# Patient Record
Sex: MALE | Race: WHITE | Employment: FULL TIME | URBAN - METROPOLITAN AREA
[De-identification: names, ages, dates, MRNs, and addresses within clinical notes are randomized per-mention and may not be internally consistent; named-entity substitution may affect disease eponyms.]

---

## 2017-01-05 ENCOUNTER — NURSE ONLY (OUTPATIENT)
Dept: INTERNAL MEDICINE CLINIC | Facility: CLINIC | Age: 42
End: 2017-01-05

## 2017-01-05 DIAGNOSIS — E53.8 VITAMIN B 12 DEFICIENCY: Primary | ICD-10-CM

## 2017-01-05 PROCEDURE — 96372 THER/PROPH/DIAG INJ SC/IM: CPT | Performed by: INTERNAL MEDICINE

## 2017-01-05 RX ORDER — CYANOCOBALAMIN 1000 UG/ML
1000 INJECTION INTRAMUSCULAR; SUBCUTANEOUS ONCE
Status: COMPLETED | OUTPATIENT
Start: 2017-01-05 | End: 2017-01-05

## 2017-01-05 RX ADMIN — CYANOCOBALAMIN 1000 MCG: 1000 INJECTION INTRAMUSCULAR; SUBCUTANEOUS at 17:35:00

## 2017-02-13 NOTE — TELEPHONE ENCOUNTER
Need 90 days  Mail order   Current outpatient prescriptions:   •  Omeprazole 40 MG Oral Capsule Delayed Release, TAKE ONE CAPSULE BY MOUTH EVERY DAY BEFORE A MEAL, Disp: 90 capsule, Rfl: 1  •  Loratadine-Pseudoephedrine ER ([facility] LORAT[facility]INE-D 24 HOUR)

## 2017-02-14 RX ORDER — LORATADINE AND PSEUDOEPHEDRINE 10; 240 MG/1; MG/1
1 TABLET, EXTENDED RELEASE ORAL
Qty: 90 TABLET | Refills: 0 | Status: SHIPPED | OUTPATIENT
Start: 2017-02-14 | End: 2017-03-09

## 2017-02-14 RX ORDER — OMEPRAZOLE 40 MG/1
CAPSULE, DELAYED RELEASE ORAL
Qty: 90 CAPSULE | Refills: 0 | Status: SHIPPED | OUTPATIENT
Start: 2017-02-14 | End: 2017-03-09

## 2017-03-09 RX ORDER — LORATADINE AND PSEUDOEPHEDRINE 10; 240 MG/1; MG/1
1 TABLET, EXTENDED RELEASE ORAL
Qty: 90 TABLET | Refills: 0 | Status: SHIPPED | OUTPATIENT
Start: 2017-03-09 | End: 2017-06-19

## 2017-03-09 RX ORDER — OMEPRAZOLE 40 MG/1
CAPSULE, DELAYED RELEASE ORAL
Qty: 90 CAPSULE | Refills: 0 | Status: SHIPPED | OUTPATIENT
Start: 2017-03-09 | End: 2017-05-27

## 2017-03-09 NOTE — TELEPHONE ENCOUNTER
NEEDS 90 DAYS TO MAIL ORDER [facility]  DO NOT SEND TO THE LOCAL PHARMACY       Current outpatient prescriptions:   •  Loratadine-Pseudoephedrine ER ([facility] LORATADINE-D 24 HOUR)  MG Oral Tablet 24 Hr, Take 1 tablet by mouth once daily. , Disp: 90 tablet, Rfl:

## 2017-04-28 ENCOUNTER — OFFICE VISIT (OUTPATIENT)
Dept: DERMATOLOGY CLINIC | Facility: CLINIC | Age: 42
End: 2017-04-28

## 2017-04-28 DIAGNOSIS — D22.9 MULTIPLE NEVI: Primary | ICD-10-CM

## 2017-04-28 DIAGNOSIS — D23.4 BENIGN NEOPLASM OF SCALP AND SKIN OF NECK: ICD-10-CM

## 2017-04-28 DIAGNOSIS — D23.60 BENIGN NEOPLASM OF SKIN OF UPPER LIMB, INCLUDING SHOULDER, UNSPECIFIED LATERALITY: ICD-10-CM

## 2017-04-28 DIAGNOSIS — L82.1 OTHER SEBORRHEIC KERATOSIS: ICD-10-CM

## 2017-04-28 DIAGNOSIS — D23.5 BENIGN NEOPLASM OF SKIN OF TRUNK, EXCEPT SCROTUM: ICD-10-CM

## 2017-04-28 DIAGNOSIS — D23.70 BENIGN NEOPLASM OF SKIN OF LOWER LIMB, INCLUDING HIP, UNSPECIFIED LATERALITY: ICD-10-CM

## 2017-04-28 DIAGNOSIS — D23.30 BENIGN NEOPLASM OF SKIN OF FACE: ICD-10-CM

## 2017-04-28 PROCEDURE — 99212 OFFICE O/P EST SF 10 MIN: CPT | Performed by: DERMATOLOGY

## 2017-04-28 PROCEDURE — 99213 OFFICE O/P EST LOW 20 MIN: CPT | Performed by: DERMATOLOGY

## 2017-05-15 NOTE — PROGRESS NOTES
Edwin Brunson is a 35year old male. HPI:     CC:  Patient presents with:  Full Skin Exam: LOV 7/24/2015. Pt presenting for full body skin exam. Pt denies personal and family hx of skin cancer.         Allergies:  Review of patient's allergies indicates couple, soda     Pt has a pacemaker No    Pt has a defibrillator No    Reaction to local anesthetic No     Social History Narrative     Family History   Problem Relation Age of Onset   • Cancer Maternal Grandfather    • Lung Disorder Paternal Grandfather neoplasm of skin of face  Benign neoplasm of skin of lower limb, including hip, unspecified laterality  Benign neoplasm of skin of trunk, except scrotum  Benign neoplasm of skin of upper limb, including shoulder, unspecified laterality  Other seborrheic ke

## 2017-05-29 RX ORDER — OMEPRAZOLE 40 MG/1
CAPSULE, DELAYED RELEASE ORAL
Qty: 90 CAPSULE | Refills: 1 | Status: SHIPPED | OUTPATIENT
Start: 2017-05-29 | End: 2017-09-13

## 2017-06-19 ENCOUNTER — TELEPHONE (OUTPATIENT)
Dept: INTERNAL MEDICINE CLINIC | Facility: CLINIC | Age: 42
End: 2017-06-19

## 2017-06-19 NOTE — TELEPHONE ENCOUNTER
Needs 90 days supply send to [facility] in [place] he has been waiting for this refill for 3 weeks I did let patient know no request was send    •  Loratadine-Pseudoephedrine ER ([facility] LORATADINE-D 24 HOUR)  MG Oral Tablet 24 Hr, Take 1 tablet by mouth once

## 2017-09-11 NOTE — TELEPHONE ENCOUNTER
Current Outpatient Prescriptions:     •  Loratadine-Pseudoephedrine ER ([facility] LORAT[facility]INE-D 24 HOUR)  MG Oral Tablet 24 Hr, Take 1 tablet by mouth once daily. , Disp: 90 tablet, Rfl: 0 REFILL

## 2017-09-12 RX ORDER — LORATADINE AND PSEUDOEPHEDRINE 10; 240 MG/1; MG/1
1 TABLET, EXTENDED RELEASE ORAL
Qty: 90 TABLET | Refills: 0 | Status: SHIPPED | OUTPATIENT
Start: 2017-09-12 | End: 2017-09-13

## 2017-09-13 ENCOUNTER — OFFICE VISIT (OUTPATIENT)
Dept: INTERNAL MEDICINE CLINIC | Facility: CLINIC | Age: 42
End: 2017-09-13

## 2017-09-13 VITALS
BODY MASS INDEX: 25.18 KG/M2 | HEIGHT: 69 IN | SYSTOLIC BLOOD PRESSURE: 137 MMHG | WEIGHT: 170 LBS | TEMPERATURE: 99 F | DIASTOLIC BLOOD PRESSURE: 81 MMHG | OXYGEN SATURATION: 94 % | HEART RATE: 97 BPM

## 2017-09-13 DIAGNOSIS — N40.1 BENIGN PROSTATIC HYPERPLASIA (BPH) WITH STRAINING ON URINATION: ICD-10-CM

## 2017-09-13 DIAGNOSIS — J30.9 CHRONIC ALLERGIC RHINITIS, UNSPECIFIED SEASONALITY, UNSPECIFIED TRIGGER: Primary | ICD-10-CM

## 2017-09-13 DIAGNOSIS — N13.8 BPH WITH OBSTRUCTION/LOWER URINARY TRACT SYMPTOMS: ICD-10-CM

## 2017-09-13 DIAGNOSIS — R39.16 BENIGN PROSTATIC HYPERPLASIA (BPH) WITH STRAINING ON URINATION: ICD-10-CM

## 2017-09-13 DIAGNOSIS — N40.1 BPH WITH OBSTRUCTION/LOWER URINARY TRACT SYMPTOMS: ICD-10-CM

## 2017-09-13 PROCEDURE — 99214 OFFICE O/P EST MOD 30 MIN: CPT | Performed by: INTERNAL MEDICINE

## 2017-09-13 PROCEDURE — 99212 OFFICE O/P EST SF 10 MIN: CPT | Performed by: INTERNAL MEDICINE

## 2017-09-13 RX ORDER — OMEPRAZOLE 40 MG/1
CAPSULE, DELAYED RELEASE ORAL
Qty: 90 CAPSULE | Refills: 1 | Status: SHIPPED | OUTPATIENT
Start: 2017-09-13 | End: 2018-01-08

## 2017-09-13 RX ORDER — LORATADINE AND PSEUDOEPHEDRINE 10; 240 MG/1; MG/1
1 TABLET, EXTENDED RELEASE ORAL
Qty: 90 TABLET | Refills: 1 | Status: SHIPPED | OUTPATIENT
Start: 2017-09-13 | End: 2018-05-09

## 2017-09-13 NOTE — PATIENT INSTRUCTIONS
Chronic allergic rhinitis, unspecified seasonality, unspecified trigger  (primary encounter diagnosis) lorataine daily , if symptoms worse  He needs to follow up with allergie specialist     [facility] with obstruction/lower urinary tract symptoms will refer to u

## 2017-09-13 NOTE — PROGRESS NOTES
HPI:    Patient ID: Tatianna Nicholas is a 35year old male.     HPI she is here today for refill on his meds   He states that his allergies this time are very bad, he can not open his eyes from itchiness , if he doesn't take his pills   He also states   Has Disp: 90 tablet Rfl: 1   Omeprazole 40 MG Oral Capsule Delayed Release TAKE 1 CAPSULE DAILY BEFOREA MEAL Disp: 90 capsule Rfl: 1     Allergies:No Known Allergies    HISTORY:  Past Medical History:   Diagnosis Date   • Allergic rhinitis    • Chronic sinusit deviation present. No thyroid mass and no thyromegaly present. Cardiovascular: Normal rate, regular rhythm, normal heart sounds and intact distal pulses. Exam reveals no gallop and no friction rub. No murmur heard.   Pulmonary/Chest: Effort normal and

## 2018-01-09 RX ORDER — OMEPRAZOLE 40 MG/1
CAPSULE, DELAYED RELEASE ORAL
Qty: 90 CAPSULE | Refills: 0 | Status: SHIPPED | OUTPATIENT
Start: 2018-01-09 | End: 2018-04-25

## 2018-01-09 NOTE — TELEPHONE ENCOUNTER
Current Outpatient Prescriptions:    •  Omeprazole 40 MG Oral Capsule Delayed Release, TAKE 1 CAPSULE DAILY BEFOREA MEAL, Disp: 90 capsule, Rfl: 1

## 2018-04-25 ENCOUNTER — TELEPHONE (OUTPATIENT)
Dept: INTERNAL MEDICINE CLINIC | Facility: CLINIC | Age: 43
End: 2018-04-25

## 2018-04-25 RX ORDER — OMEPRAZOLE 40 MG/1
CAPSULE, DELAYED RELEASE ORAL
Qty: 90 CAPSULE | Refills: 0 | Status: SHIPPED | OUTPATIENT
Start: 2018-04-25 | End: 2018-05-09

## 2018-05-09 ENCOUNTER — OFFICE VISIT (OUTPATIENT)
Dept: INTERNAL MEDICINE CLINIC | Facility: CLINIC | Age: 43
End: 2018-05-09

## 2018-05-09 ENCOUNTER — PRIOR ORIGINAL RECORDS (OUTPATIENT)
Dept: OTHER | Age: 43
End: 2018-05-09

## 2018-05-09 VITALS
DIASTOLIC BLOOD PRESSURE: 96 MMHG | HEART RATE: 100 BPM | SYSTOLIC BLOOD PRESSURE: 130 MMHG | TEMPERATURE: 98 F | HEIGHT: 69 IN | OXYGEN SATURATION: 98 % | BODY MASS INDEX: 26.1 KG/M2 | WEIGHT: 176.19 LBS

## 2018-05-09 DIAGNOSIS — Z00.00 PHYSICAL EXAM, ANNUAL: Primary | ICD-10-CM

## 2018-05-09 PROCEDURE — 99396 PREV VISIT EST AGE 40-64: CPT | Performed by: INTERNAL MEDICINE

## 2018-05-09 PROCEDURE — 36415 COLL VENOUS BLD VENIPUNCTURE: CPT | Performed by: INTERNAL MEDICINE

## 2018-05-09 RX ORDER — MELATONIN
1000 DAILY
COMMUNITY
End: 2020-03-14 | Stop reason: ALTCHOICE

## 2018-05-09 RX ORDER — OMEPRAZOLE 20 MG/1
20 CAPSULE, DELAYED RELEASE ORAL
Qty: 90 CAPSULE | Refills: 0 | Status: SHIPPED | OUTPATIENT
Start: 2018-05-09 | End: 2018-07-22

## 2018-05-09 RX ORDER — OMEPRAZOLE 40 MG/1
CAPSULE, DELAYED RELEASE ORAL
Qty: 90 CAPSULE | Refills: 0 | Status: SHIPPED | OUTPATIENT
Start: 2018-05-09 | End: 2018-05-09 | Stop reason: CLARIF

## 2018-05-09 RX ORDER — LORATADINE AND PSEUDOEPHEDRINE 10; 240 MG/1; MG/1
1 TABLET, EXTENDED RELEASE ORAL
Qty: 90 TABLET | Refills: 1 | Status: SHIPPED | OUTPATIENT
Start: 2018-05-09 | End: 2018-06-21

## 2018-05-10 NOTE — PATIENT INSTRUCTIONS
Prevention Guidelines, Men Ages 36 to 52  Screening tests and vaccines are an important part of managing your health. Health counseling is essential, too. Below are guidelines for these, for men ages 36 to 52.  Talk with your healthcare provider to make s Chickenpox (varicella) All men in this age group who have no record of this infection or vaccine 2 doses; the second dose should be given at least 4 weeks after the first dose   Hepatitis A Men at increased risk for infection – talk with your healthcare pr Use of tobacco and the health effects it can cause All men in this age group Every exam   [facility]  Date Last Reviewed: 2/1/2017  © [id number] [employer] [address].  [facility]

## 2018-05-17 ENCOUNTER — APPOINTMENT (OUTPATIENT)
Dept: GENERAL RADIOLOGY | Facility: HOSPITAL | Age: 43
End: 2018-05-17
Payer: COMMERCIAL

## 2018-05-17 ENCOUNTER — PRIOR ORIGINAL RECORDS (OUTPATIENT)
Dept: OTHER | Age: 43
End: 2018-05-17

## 2018-05-17 ENCOUNTER — HOSPITAL ENCOUNTER (EMERGENCY)
Facility: HOSPITAL | Age: 43
Discharge: HOME | End: 2018-05-17
Payer: COMMERCIAL

## 2018-05-17 VITALS
OXYGEN SATURATION: 96 % | WEIGHT: 170 LBS | HEART RATE: 94 BPM | RESPIRATION RATE: 20 BRPM | BODY MASS INDEX: 25.18 KG/M2 | SYSTOLIC BLOOD PRESSURE: 122 MMHG | DIASTOLIC BLOOD PRESSURE: 90 MMHG | HEIGHT: 69 IN | TEMPERATURE: 98 F

## 2018-05-17 DIAGNOSIS — R07.89 CHEST PAIN, ATYPICAL: Primary | ICD-10-CM

## 2018-05-17 PROCEDURE — 36415 COLL VENOUS BLD VENIPUNCTURE: CPT

## 2018-05-17 PROCEDURE — 93005 ELECTROCARDIOGRAM TRACING: CPT

## 2018-05-17 PROCEDURE — 85379 FIBRIN DEGRADATION QUANT: CPT | Performed by: EMERGENCY MEDICINE

## 2018-05-17 PROCEDURE — 85025 COMPLETE CBC W/AUTO DIFF WBC: CPT

## 2018-05-17 PROCEDURE — 93010 ELECTROCARDIOGRAM REPORT: CPT | Performed by: INTERNAL MEDICINE

## 2018-05-17 PROCEDURE — 71045 X-RAY EXAM CHEST 1 VIEW: CPT

## 2018-05-17 PROCEDURE — 99285 EMERGENCY DEPT VISIT HI MDM: CPT

## 2018-05-17 PROCEDURE — 84484 ASSAY OF TROPONIN QUANT: CPT

## 2018-05-17 PROCEDURE — 80048 BASIC METABOLIC PNL TOTAL CA: CPT

## 2018-05-18 NOTE — ED PROVIDER NOTES
Patient Seen in: [facility] AND [facility] Emergency Department    History   Patient presents with:  Chest Pain Angina (cardiovascular)    Stated Complaint: chest pain     HPI    Patient presents with complaint of about 5 hours ago with onset of a discomfort in tobacco: Never Used                      Comment: 3x cigars per year  Alcohol use:  Yes              Comment: Occasionally       Review of Systems  Constitutional: no fever, has otherwise been feeling well, normal appetite, normal energy  HEENT: No cough, n is not feeling the symptoms at this time.   I discussed what workup showed limitations of workup in the ER possible etiologies we discussed limitations of testing and we discussed possible admission versus discharge for follow-up testing we discussed possib

## 2018-05-23 ENCOUNTER — OFFICE VISIT (OUTPATIENT)
Dept: INTERNAL MEDICINE CLINIC | Facility: CLINIC | Age: 43
End: 2018-05-23

## 2018-05-23 VITALS
WEIGHT: 174.81 LBS | HEART RATE: 84 BPM | DIASTOLIC BLOOD PRESSURE: 80 MMHG | HEIGHT: 69 IN | OXYGEN SATURATION: 96 % | SYSTOLIC BLOOD PRESSURE: 130 MMHG | TEMPERATURE: 98 F | BODY MASS INDEX: 25.89 KG/M2

## 2018-05-23 DIAGNOSIS — R07.9 CHEST PAIN IN ADULT: ICD-10-CM

## 2018-05-23 DIAGNOSIS — Z09 FOLLOW UP: Primary | ICD-10-CM

## 2018-05-23 PROCEDURE — 99212 OFFICE O/P EST SF 10 MIN: CPT | Performed by: INTERNAL MEDICINE

## 2018-05-23 PROCEDURE — 99213 OFFICE O/P EST LOW 20 MIN: CPT | Performed by: INTERNAL MEDICINE

## 2018-05-24 NOTE — PROGRESS NOTES
HPI:    Patient ID: Vamshi Yates is a 38year old male. HPI he came in today for follow-up from emergency room. 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 He describes the pain substernal,  nonradiating not Take 1,000 mcg by mouth daily. Disp:  Rfl:    Loratadine-Pseudoephedrine ER ([facility] LORATADINE-D 24 HOUR)  MG Oral Tablet 24 Hr Take 1 tablet by mouth once daily.  Disp: 90 tablet Rfl: 1   omeprazole 20 MG Oral Capsule Delayed Release Take 1 capsule (20 EOM are normal. Pupils are equal, round, and reactive to light. Right eye exhibits no discharge. Left eye exhibits no discharge. No scleral icterus. Neck: Normal range of motion. Neck supple. No JVD present. No tracheal tenderness present.  No tracheal de SCORING        #[id number]

## 2018-05-24 NOTE — PATIENT INSTRUCTIONS
Chest pain in adult etiology unclear ?  Low risk factors for cad ( hypercholesterolemia ) could be esophageal spasm as well, he has acid reflux/hiatal hernia - continue with current medication, calcium score, he has kai scheduled with cardiology, explained

## 2018-06-18 LAB
ALBUMIN: 4.3 G/DL
ALKALINE PHOSPHATATE(ALK PHOS): 39 IU/L
ALT (SGPT): 15 U/L
AST (SGOT): 15 U/L
BILIRUBIN TOTAL: 0.7 MG/DL
BUN: 11 MG/DL
BUN: 13 MG/DL
CALCIUM: 9.4 MG/DL
CALCIUM: 9.5 MG/DL
CHLORIDE: 101 MEQ/L
CHLORIDE: 101 MEQ/L
CHOLESTEROL, TOTAL: 214 MG/DL
CREATININE, SERUM: 0.85 MG/DL
CREATININE, SERUM: 0.89 MG/DL
GLOBULIN: 2.9 G/DL
GLUCOSE: 90 MG/DL
GLUCOSE: 90 MG/DL
GLUCOSE: 93 MG/DL
HDL CHOLESTEROL: 48 MG/DL
HEMATOCRIT: 46.3 %
HEMATOCRIT: 47.1 %
HEMOGLOBIN: 16 G/DL
HEMOGLOBIN: 16 G/DL
LDL CHOLESTEROL: 140 MG/DL
NON-HDL CHOLESTEROL: 166 MG/DL
PLATELETS: 255 K/UL
PLATELETS: 293 K/UL
POTASSIUM, SERUM: 4.1 MEQ/L
POTASSIUM, SERUM: 4.2 MEQ/L
PROTEIN, TOTAL: 7.2 G/DL
RED BLOOD COUNT: 5.64 X 10-6/U
RED BLOOD COUNT: 5.7 X 10-6/U
SGOT (AST): 15 IU/L
SGPT (ALT): 15 IU/L
SODIUM: 138 MEQ/L
SODIUM: 139 MEQ/L
THYROID STIMULATING HORMONE: 0.93 MLU/L
TRIGLYCERIDES: 132 MG/DL
WHITE BLOOD COUNT: 8.3 X 10-3/U
WHITE BLOOD COUNT: 9.3 X 10-3/U

## 2018-06-19 ENCOUNTER — TELEPHONE (OUTPATIENT)
Dept: INTERNAL MEDICINE CLINIC | Facility: CLINIC | Age: 43
End: 2018-06-19

## 2018-06-19 ENCOUNTER — PRIOR ORIGINAL RECORDS (OUTPATIENT)
Dept: OTHER | Age: 43
End: 2018-06-19

## 2018-06-19 NOTE — TELEPHONE ENCOUNTER
Needs 90  Days he only received 30 pills last time  Please resent     Current Outpatient Prescriptions:   ••  Loratadine-Pseudoephedrine ER ([facility] LORAT[facility]INE-D 24 HOUR)  MG Oral Tablet 24 Hr, Take 1 tablet by mouth once daily. , Disp: 90 tablet, Rfl: 1

## 2018-06-21 NOTE — TELEPHONE ENCOUNTER
Current Outpatient Prescriptions:   •  •  Loratadine-Pseudoephedrine ER ([facility] LORATADINE-D 24 HOUR)  MG Oral Tablet 24 Hr, Take 1 tablet by mouth once daily. , Disp: 90 tablet, Rfl: 1  •

## 2018-06-22 RX ORDER — LORATADINE AND PSEUDOEPHEDRINE 10; 240 MG/1; MG/1
1 TABLET, EXTENDED RELEASE ORAL
Qty: 90 TABLET | Refills: 1 | OUTPATIENT
Start: 2018-06-22 | End: 2018-09-14

## 2018-06-22 NOTE — TELEPHONE ENCOUNTER
Please advise on refill request.    Refill Protocol Appointment Criteria  · Appointment scheduled in the past 6 months or in the next 3 months  Recent Outpatient Visits            1 month ago Follow up    [facility], [facility] [place],

## 2018-07-09 ENCOUNTER — HOSPITAL ENCOUNTER (OUTPATIENT)
Dept: CT IMAGING | Age: 43
Discharge: HOME | End: 2018-07-09
Attending: INTERNAL MEDICINE

## 2018-07-09 DIAGNOSIS — R07.9 CHEST PAIN IN ADULT: ICD-10-CM

## 2018-07-09 NOTE — PROGRESS NOTES
Pt seen at [facility], [facility]:  PRELIMINARY SCORE= 0.0  BP= 130/80  Cholestec labs as follows: Fasting  TC= 242  HDl= 53  LDL= 160  TG= 142  GLUCOSE= 85  All results and risk factors discussed with patient; all questions and concerns addressed.   Lillian López

## 2018-07-11 ENCOUNTER — TELEPHONE (OUTPATIENT)
Dept: INTERNAL MEDICINE CLINIC | Facility: CLINIC | Age: 43
End: 2018-07-11

## 2018-07-11 NOTE — TELEPHONE ENCOUNTER
Call placed to patient to follow up if he has received voicemail regarding repeat ct chest. Per patient he has received voicemail. He is aware he is to repeat chest ct in 3-6 months.  As of now he wants to do repeat chest ct first then decide if he needs to

## 2018-07-13 ENCOUNTER — MYAURORA ACCOUNT LINK (OUTPATIENT)
Dept: OTHER | Age: 43
End: 2018-07-13

## 2018-07-16 ENCOUNTER — PRIOR ORIGINAL RECORDS (OUTPATIENT)
Dept: OTHER | Age: 43
End: 2018-07-16

## 2018-07-22 RX ORDER — OMEPRAZOLE 20 MG/1
20 CAPSULE, DELAYED RELEASE ORAL
Qty: 90 CAPSULE | Refills: 0 | Status: SHIPPED | OUTPATIENT
Start: 2018-07-22 | End: 2019-05-09

## 2018-07-22 RX ORDER — OMEPRAZOLE 40 MG/1
CAPSULE, DELAYED RELEASE ORAL
Qty: 90 CAPSULE | Refills: 0 | OUTPATIENT
Start: 2018-07-22

## 2018-07-22 NOTE — TELEPHONE ENCOUNTER
Omeprazole 20 mg active in profile not omeprazole 40 mg being requested. Correct dosage sent per protocol.      Refill Protocol Appointment Criteria  · Appointment scheduled in the past 12 months or in the next 3 months Gastrointestional Medication:  ·   Re

## 2018-08-15 ENCOUNTER — OFFICE VISIT (OUTPATIENT)
Dept: SURGERY | Facility: CLINIC | Age: 43
End: 2018-08-15
Payer: COMMERCIAL

## 2018-08-15 VITALS
HEART RATE: 99 BPM | WEIGHT: 175 LBS | DIASTOLIC BLOOD PRESSURE: 84 MMHG | BODY MASS INDEX: 25.92 KG/M2 | HEIGHT: 69 IN | SYSTOLIC BLOOD PRESSURE: 126 MMHG | TEMPERATURE: 98 F

## 2018-08-15 DIAGNOSIS — R35.0 FREQUENCY OF URINATION: Primary | ICD-10-CM

## 2018-08-15 NOTE — PROGRESS NOTES
[facility]3], [facility]  Initial Office Consultation    HPI:   Terry Roach is a 38year old male here today for follow-up of his mild lower urinary tract symptoms. He was a patient of Dr. Madison Mcdonald prior to his care home.   He was last seen in 6/2016 for 1,000 mcg by mouth daily. Disp:  Rfl:        Allergies: Patient has no known allergies. REVIEW OF SYSTEMS:  Review of Systems   Constitutional: Negative for appetite change, chills, fatigue, fever and unexpected weight change.    HENT: Negative for heari 05/17/2018    05/17/2018   MPV 8.3 05/17/2018       Lab Results  Component Value Date   GLU 93 05/17/2018   BUN 11 05/17/2018   BUNCREA 12.9 05/17/2018   CREATSERUM 0.85 05/17/2018   ANIONGAP 8 05/17/2018   GFRNAA >60 05/17/2018   GFRAA >60 05/17/20

## 2018-09-04 ENCOUNTER — PRIOR ORIGINAL RECORDS (OUTPATIENT)
Dept: OTHER | Age: 43
End: 2018-09-04

## 2018-09-14 RX ORDER — LORATADINE AND PSEUDOEPHEDRINE 10; 240 MG/1; MG/1
1 TABLET, EXTENDED RELEASE ORAL
Qty: 90 TABLET | Refills: 1 | Status: SHIPPED | OUTPATIENT
Start: 2018-09-14 | End: 2019-03-30

## 2018-10-20 ENCOUNTER — PATIENT MESSAGE (OUTPATIENT)
Dept: INTERNAL MEDICINE CLINIC | Facility: CLINIC | Age: 43
End: 2018-10-20

## 2018-10-20 ENCOUNTER — OFFICE VISIT (OUTPATIENT)
Dept: INTERNAL MEDICINE CLINIC | Facility: CLINIC | Age: 43
End: 2018-10-20
Payer: COMMERCIAL

## 2018-10-20 VITALS
SYSTOLIC BLOOD PRESSURE: 127 MMHG | BODY MASS INDEX: 25.62 KG/M2 | HEART RATE: 105 BPM | WEIGHT: 173 LBS | DIASTOLIC BLOOD PRESSURE: 84 MMHG | TEMPERATURE: 98 F | HEIGHT: 69 IN

## 2018-10-20 DIAGNOSIS — E78.2 HYPERLIPEMIA, MIXED: ICD-10-CM

## 2018-10-20 DIAGNOSIS — R05.9 COUGH: Primary | ICD-10-CM

## 2018-10-20 DIAGNOSIS — R09.81 CONGESTED NOSE: ICD-10-CM

## 2018-10-20 DIAGNOSIS — R91.1 INCIDENTAL LUNG NODULE: ICD-10-CM

## 2018-10-20 DIAGNOSIS — E53.8 LOW SERUM VITAMIN B12: ICD-10-CM

## 2018-10-20 DIAGNOSIS — J30.2 SEASONAL ALLERGIES: ICD-10-CM

## 2018-10-20 PROCEDURE — 36415 COLL VENOUS BLD VENIPUNCTURE: CPT | Performed by: INTERNAL MEDICINE

## 2018-10-20 PROCEDURE — 99214 OFFICE O/P EST MOD 30 MIN: CPT | Performed by: INTERNAL MEDICINE

## 2018-10-20 RX ORDER — BENZONATATE 100 MG/1
100 CAPSULE ORAL 3 TIMES DAILY PRN
Qty: 20 CAPSULE | Refills: 0 | Status: SHIPPED | OUTPATIENT
Start: 2018-10-20 | End: 2018-10-27

## 2018-10-20 NOTE — PROGRESS NOTES
HPI:    Patient ID: Cory Campos is a 38year old male. HPI  Patient comes in with complaint of congestion runny nose cough is been going on for a few weeks now not getting better feeling fatigue due to not sleeping well.   Patient does have allergies Surgical History:   Procedure Laterality Date   • OTHER SURGICAL HISTORY  2004    Chronic sinusitis, sinus surgery    • VASECTOMY  2011      Family History   Problem Relation Age of Onset   • Cancer Maternal Grandfather    • Lung Disorder Paternal Griselda Cheri above  Low serum vitamin b12 check B12 let you know results  Hyperlipemia, mixed in the past with elevated cholesterol his fasting we will check today will call you with results  Incidental lung nodule CAT scan was ordered by Dr. Mauri Mccray will give pr

## 2018-10-22 RX ORDER — FLUTICASONE PROPIONATE 50 MCG
2 SPRAY, SUSPENSION (ML) NASAL DAILY
Qty: 3 BOTTLE | Refills: 3 | Status: SHIPPED | OUTPATIENT
Start: 2018-10-22 | End: 2019-10-17

## 2018-10-22 NOTE — TELEPHONE ENCOUNTER
From: Vamshi Yates  To: Sanket Sy MD  Sent: 10/20/2018 6:50 PM CDT  Subject: Prescription Question    Hi Viviane Chery to meet you today. You Suggested I take Flonase but did not provide a prescription and [facility] one.  Can you create a prescrip

## 2018-10-29 RX ORDER — OMEPRAZOLE 40 MG/1
CAPSULE, DELAYED RELEASE ORAL
Qty: 90 CAPSULE | Refills: 0 | Status: SHIPPED | OUTPATIENT
Start: 2018-10-29 | End: 2019-01-10

## 2018-11-03 ENCOUNTER — HOSPITAL ENCOUNTER (OUTPATIENT)
Dept: CT IMAGING | Facility: HOSPITAL | Age: 43
Discharge: HOME | End: 2018-11-03
Attending: INTERNAL MEDICINE
Payer: COMMERCIAL

## 2018-11-03 DIAGNOSIS — R91.1 LUNG NODULE: ICD-10-CM

## 2018-11-03 PROCEDURE — 71250 CT THORAX DX C-: CPT | Performed by: INTERNAL MEDICINE

## 2018-11-06 ENCOUNTER — TELEPHONE (OUTPATIENT)
Dept: INTERNAL MEDICINE CLINIC | Facility: CLINIC | Age: 43
End: 2018-11-06

## 2018-11-06 ENCOUNTER — TELEPHONE (OUTPATIENT)
Dept: PULMONOLOGY | Facility: CLINIC | Age: 43
End: 2018-11-06

## 2018-11-06 NOTE — TELEPHONE ENCOUNTER
Spoke with patient today regarding CT results and provided pulmonology referral information. He will schedule follow up with pulmonology today.

## 2018-11-06 NOTE — TELEPHONE ENCOUNTER
I just spoke with him also,   He has seen Dr. Job Cuevas in past told him who ever he can get in soonest with Dr. Job Cuevas or Dr. Adi Maza to call if unable to get in with 30 days.

## 2018-11-06 NOTE — TELEPHONE ENCOUNTER
Pt states that he had CT done per Dr. Rafia Strong and it showed lung nodules. Pt last saw Dr. Ekaterina Womack in 2014 and scheduled appt for 12/12/18 (next available).   Pt is requesting that Dr Ekaterina Womack look at  [facility] and advise if he should have any testing done before this

## 2018-11-06 NOTE — TELEPHONE ENCOUNTER
Has appt with Dr. Blanco Bain 12/12/18  Ct chest report faxed to Dr. Blanco Bain to review. Asking if he should have PET scan before seeing Dr. Blanco Bain asking how urgent this is with increase in size of nodule.

## 2018-11-07 ENCOUNTER — OFFICE VISIT (OUTPATIENT)
Dept: PULMONOLOGY | Facility: CLINIC | Age: 43
End: 2018-11-07
Payer: COMMERCIAL

## 2018-11-07 ENCOUNTER — TELEPHONE (OUTPATIENT)
Dept: INTERNAL MEDICINE CLINIC | Facility: CLINIC | Age: 43
End: 2018-11-07

## 2018-11-07 VITALS
HEART RATE: 118 BPM | RESPIRATION RATE: 16 BRPM | SYSTOLIC BLOOD PRESSURE: 113 MMHG | HEIGHT: 69 IN | OXYGEN SATURATION: 95 % | DIASTOLIC BLOOD PRESSURE: 77 MMHG | BODY MASS INDEX: 26.39 KG/M2 | WEIGHT: 178.19 LBS

## 2018-11-07 DIAGNOSIS — R91.1 LUNG NODULE: Primary | ICD-10-CM

## 2018-11-07 PROCEDURE — 99212 OFFICE O/P EST SF 10 MIN: CPT | Performed by: INTERNAL MEDICINE

## 2018-11-07 PROCEDURE — 99214 OFFICE O/P EST MOD 30 MIN: CPT | Performed by: INTERNAL MEDICINE

## 2018-11-07 NOTE — TELEPHONE ENCOUNTER
LMTCB--ok to transfer to RN, if RN not available please offer appt today 11/7 @ 4 pm with Dr. Leslie iLn at [facility] (Refer to TE 11/6/18 Dr. Leslie Lin)

## 2018-11-07 NOTE — TELEPHONE ENCOUNTER
Requesting to speak to you direct  patient saw  Dr. Angi Zamorano as recommended . He states the results were not correct  The radiology misread the results .  Need to be corrected in his chart

## 2018-11-07 NOTE — PROGRESS NOTES
HPI:    Patient ID: Terry Roach is a 37year old male.     HPI  Patient completely asymptomatic from pulmonary point of view  He had recent cold symptoms a few weeks ago which is now completely resolved  Otherwise no chest pain or shortness of breath  N likely benign granuloma   37year old male with no h/o smoking   Asymptomatic with normal lung exam     Seen in 2014 for few small lung nodules / largest LLL 9-11 mm and was discharged since stable .     Now had another chest ct and showed subtle increase i

## 2018-11-16 ENCOUNTER — PRIOR ORIGINAL RECORDS (OUTPATIENT)
Dept: OTHER | Age: 43
End: 2018-11-16

## 2019-01-10 ENCOUNTER — TELEPHONE (OUTPATIENT)
Dept: INTERNAL MEDICINE CLINIC | Facility: CLINIC | Age: 44
End: 2019-01-10

## 2019-01-10 ENCOUNTER — PRIOR ORIGINAL RECORDS (OUTPATIENT)
Dept: OTHER | Age: 44
End: 2019-01-10

## 2019-01-10 RX ORDER — OMEPRAZOLE 40 MG/1
CAPSULE, DELAYED RELEASE ORAL
Qty: 90 CAPSULE | Refills: 0 | Status: SHIPPED | OUTPATIENT
Start: 2019-01-10 | End: 2019-05-09

## 2019-02-28 VITALS
SYSTOLIC BLOOD PRESSURE: 120 MMHG | OXYGEN SATURATION: 98 % | HEIGHT: 69 IN | HEART RATE: 64 BPM | WEIGHT: 173 LBS | DIASTOLIC BLOOD PRESSURE: 80 MMHG | BODY MASS INDEX: 25.62 KG/M2

## 2019-04-01 RX ORDER — LORATADINE 10 MG
TABLET ORAL
Qty: 165 TABLET | Refills: 0 | Status: SHIPPED | OUTPATIENT
Start: 2019-04-01 | End: 2019-04-04

## 2019-04-04 RX ORDER — LORATADINE 10 MG
TABLET ORAL
Qty: 165 TABLET | Refills: 0 | Status: SHIPPED | OUTPATIENT
Start: 2019-04-04 | End: 2019-04-11

## 2019-04-11 RX ORDER — LORATADINE AND PSEUDOEPHEDRINE 10; 240 MG/1; MG/1
1 TABLET, EXTENDED RELEASE ORAL
Qty: 165 TABLET | Refills: 0 | Status: SHIPPED | OUTPATIENT
Start: 2019-04-11 | End: 2019-04-13 | Stop reason: CLARIF

## 2019-04-11 NOTE — TELEPHONE ENCOUNTER
Refill passed per [employer] protocol.  Pharmacy did not receive previous request.  Refill Protocol Appointment Criteria  · Appointment scheduled in the past 12 months or in the next 3 months  Recent Outpatient Visits            5 months ago Lung nodul

## 2019-04-13 RX ORDER — LORATADINE 10 MG
TABLET ORAL
Qty: 165 TABLET | Refills: 0 | Status: SHIPPED | OUTPATIENT
Start: 2019-04-13 | End: 2019-12-01

## 2019-04-27 ENCOUNTER — OFFICE VISIT (OUTPATIENT)
Dept: DERMATOLOGY CLINIC | Facility: CLINIC | Age: 44
End: 2019-04-27
Payer: COMMERCIAL

## 2019-04-27 DIAGNOSIS — D23.70 BENIGN NEOPLASM OF SKIN OF LOWER LIMB, INCLUDING HIP, UNSPECIFIED LATERALITY: ICD-10-CM

## 2019-04-27 DIAGNOSIS — D23.30 BENIGN NEOPLASM OF SKIN OF FACE: ICD-10-CM

## 2019-04-27 DIAGNOSIS — D23.5 BENIGN NEOPLASM OF SKIN OF TRUNK, EXCEPT SCROTUM: ICD-10-CM

## 2019-04-27 DIAGNOSIS — D22.9 MULTIPLE NEVI: Primary | ICD-10-CM

## 2019-04-27 DIAGNOSIS — D23.4 BENIGN NEOPLASM OF SCALP AND SKIN OF NECK: ICD-10-CM

## 2019-04-27 DIAGNOSIS — L82.1 OTHER SEBORRHEIC KERATOSIS: ICD-10-CM

## 2019-04-27 DIAGNOSIS — D23.60 BENIGN NEOPLASM OF SKIN OF UPPER LIMB, INCLUDING SHOULDER, UNSPECIFIED LATERALITY: ICD-10-CM

## 2019-04-27 PROCEDURE — 99213 OFFICE O/P EST LOW 20 MIN: CPT | Performed by: DERMATOLOGY

## 2019-04-27 PROCEDURE — 99212 OFFICE O/P EST SF 10 MIN: CPT | Performed by: DERMATOLOGY

## 2019-05-01 NOTE — TELEPHONE ENCOUNTER
Refill passed per [facility] protocol  Refill Protocol Appointment Criteria  · Appointment scheduled in the past 12 months or in the next 3 months  Recent Outpatient Visits            4 days ago     [facility] Ron Zheng

## 2019-05-02 RX ORDER — OMEPRAZOLE 40 MG/1
CAPSULE, DELAYED RELEASE ORAL
Qty: 90 CAPSULE | Refills: 1 | OUTPATIENT
Start: 2019-05-02

## 2019-05-06 NOTE — PROGRESS NOTES
Doris Ya is a 37year old male. HPI:     CC:  Patient presents with:  Moles: LOV 4-28-17. Pt here for annual full body check. No c/o today. No pers or fam hx of skin ca. Allergies:  Patient has no known allergies.     HISTORY:    Past Med education: Not on file      Highest education level: Not on file    Occupational History      Not on file    Social Needs      Financial resource strain: Not on file      Food insecurity:        Worry: Not on file        Inability: Not on file      Transpo defibrillator: No        Reaction to local anesthetic: No    Social History Narrative      Not on file    Family History   Problem Relation Age of Onset   • Cancer Maternal Grandfather    • Lung Disorder Paternal Grandfather         Emphysema       There w of face  Benign neoplasm of skin of lower limb, including hip, unspecified laterality  Benign neoplasm of skin of trunk, except scrotum  Benign neoplasm of skin of upper limb, including shoulder, unspecified laterality  Other seborrheic keratosis    See de

## 2019-05-09 ENCOUNTER — TELEPHONE (OUTPATIENT)
Dept: INTERNAL MEDICINE CLINIC | Facility: CLINIC | Age: 44
End: 2019-05-09

## 2019-05-09 RX ORDER — OMEPRAZOLE 40 MG/1
CAPSULE, DELAYED RELEASE ORAL
Qty: 90 CAPSULE | Refills: 1 | Status: SHIPPED | OUTPATIENT
Start: 2019-05-09 | End: 2019-11-16

## 2019-07-03 ENCOUNTER — TELEPHONE (OUTPATIENT)
Dept: PULMONOLOGY | Facility: CLINIC | Age: 44
End: 2019-07-03

## 2019-08-20 NOTE — TELEPHONE ENCOUNTER
Rx passed protocol but considered controlled, so needs MD approval.  Refill Protocol Appointment Criteria  · Appointment scheduled in the past 12 months or in the next 3 months  Recent Outpatient Visits            4 months ago Lung nodule    Ann Terry supervision

## 2019-09-04 ENCOUNTER — OFFICE VISIT (OUTPATIENT)
Dept: INTERNAL MEDICINE CLINIC | Facility: CLINIC | Age: 44
End: 2019-09-04
Payer: COMMERCIAL

## 2019-09-04 VITALS
DIASTOLIC BLOOD PRESSURE: 78 MMHG | TEMPERATURE: 98 F | RESPIRATION RATE: 18 BRPM | WEIGHT: 178 LBS | SYSTOLIC BLOOD PRESSURE: 121 MMHG | HEART RATE: 78 BPM | BODY MASS INDEX: 26.36 KG/M2 | HEIGHT: 69 IN

## 2019-09-04 DIAGNOSIS — M54.40 ACUTE RIGHT-SIDED LOW BACK PAIN WITH SCIATICA, SCIATICA LATERALITY UNSPECIFIED: Primary | ICD-10-CM

## 2019-09-04 PROCEDURE — 99214 OFFICE O/P EST MOD 30 MIN: CPT | Performed by: INTERNAL MEDICINE

## 2019-09-04 RX ORDER — METHYLPREDNISOLONE 4 MG/1
TABLET ORAL
Qty: 1 KIT | Refills: 0 | Status: SHIPPED | OUTPATIENT
Start: 2019-09-04 | End: 2020-02-17

## 2019-09-04 RX ORDER — CYCLOBENZAPRINE HCL 5 MG
5 TABLET ORAL 3 TIMES DAILY PRN
Qty: 30 TABLET | Refills: 0 | Status: SHIPPED | OUTPATIENT
Start: 2019-09-04 | End: 2020-02-17

## 2019-09-04 NOTE — PROGRESS NOTES
HPI:    Patient ID: Domo Phelps is a 37year old male. HPI he came in today complaining of right lower back pain radiating down to his right leg that started on Saturday .  He states is worse when he is sitting and walking and is better when he is st MEAL Disp: 90 capsule Rfl: 1   WAL-CHESTERIN D 24 HOUR  MG Oral Tablet 24 Hr TAKE ONE TABLET BY MOUTH DAILY Disp: 165 tablet Rfl: 0   Fluticasone Propionate 50 MCG/ACT Nasal Suspension 2 sprays by Each Nare route daily.  Disp: 3 Bottle Rfl: 3   Vitamin B-1 light. Conjunctivae and EOM are normal. Right eye exhibits no discharge. Left eye exhibits no discharge. No scleral icterus. Neck: Normal range of motion. Neck supple. No JVD present. No tracheal tenderness present. No tracheal deviation present.  No thyr Visit:  Requested Prescriptions      No prescriptions requested or ordered in this encounter       Imaging & Referrals:  None        PL#[id number]

## 2019-09-04 NOTE — PATIENT INSTRUCTIONS
Lower back painCareful with back. Correct lifting with legs and not with back. Turn body completely to lift something from side. Back exercises.  Correct posture when sitting with feet flat on floor and back against chair and computer at eye level., medrol

## 2019-10-01 ENCOUNTER — TELEPHONE (OUTPATIENT)
Dept: INTERNAL MEDICINE CLINIC | Facility: CLINIC | Age: 44
End: 2019-10-01

## 2019-10-01 NOTE — TELEPHONE ENCOUNTER
Patient calling to find out what number he needs to call to schedule CT chest appointment. Patient given number for central scheduling. Patient transferred to central scheduling to make appointment.

## 2019-10-08 ENCOUNTER — HOSPITAL ENCOUNTER (OUTPATIENT)
Dept: CT IMAGING | Facility: HOSPITAL | Age: 44
Discharge: HOME | End: 2019-10-08
Attending: INTERNAL MEDICINE
Payer: COMMERCIAL

## 2019-10-08 DIAGNOSIS — R91.1 LUNG NODULE: ICD-10-CM

## 2019-10-08 PROCEDURE — 71250 CT THORAX DX C-: CPT | Performed by: INTERNAL MEDICINE

## 2019-11-16 RX ORDER — OMEPRAZOLE 40 MG/1
CAPSULE, DELAYED RELEASE ORAL
Qty: 90 CAPSULE | Refills: 1 | Status: SHIPPED | OUTPATIENT
Start: 2019-11-16 | End: 2020-06-04

## 2019-11-17 NOTE — TELEPHONE ENCOUNTER
Refill passed per [facility] protocol.     Refill Protocol Appointment Criteria  · Appointment scheduled in the past 12 months or in the next 3 months  Recent Outpatient Visits            2 months ago Acute right-sided low back pain with sciatica, scia

## 2019-12-03 DIAGNOSIS — R91.8 LUNG NODULES: Primary | ICD-10-CM

## 2019-12-03 RX ORDER — LORATADINE 10 MG
TABLET ORAL
Qty: 90 TABLET | Refills: 1 | Status: SHIPPED
Start: 2019-12-03 | End: 2019-12-03

## 2019-12-03 NOTE — TELEPHONE ENCOUNTER
Refill passed per [facility] protocol. RN unable to approve. pls send if in agreement. Thank you.     Requested Prescriptions   Pending Prescriptions Disp Refills   • WAL-LASHAUN D 24 HOUR  MG Oral Tablet 24 Hr [Pharmacy Med Name: Percy Castorena 25 HOUR

## 2019-12-04 RX ORDER — LORATADINE 10 MG
TABLET ORAL
Qty: 90 TABLET | Refills: 1 | Status: SHIPPED
Start: 2019-12-04 | End: 2020-06-04

## 2019-12-04 NOTE — TELEPHONE ENCOUNTER
WAL-ITIN D 24 HOUR  MG Oral Tablet 24 Hr  Called into the [facility] in [place].      Call center please call and schedule an appointment. Thank You. An 99.cot message was also sent.

## 2019-12-04 NOTE — TELEPHONE ENCOUNTER
Script transmission to pharmacy failed earlier today. pls resend. Thank you.     Requested Prescriptions   Pending Prescriptions Disp Refills   • WAL-ITIN D 24 HOUR  MG Oral Tablet 24 Hr [Pharmacy Med Name: Aguila Pagan 25 HOUR TABLETS 10'S] 165 tablet

## 2020-02-17 ENCOUNTER — OFFICE VISIT (OUTPATIENT)
Dept: INTERNAL MEDICINE CLINIC | Facility: CLINIC | Age: 45
End: 2020-02-17
Payer: COMMERCIAL

## 2020-02-17 VITALS
WEIGHT: 180 LBS | HEART RATE: 78 BPM | HEIGHT: 69 IN | RESPIRATION RATE: 18 BRPM | DIASTOLIC BLOOD PRESSURE: 82 MMHG | SYSTOLIC BLOOD PRESSURE: 129 MMHG | TEMPERATURE: 98 F | BODY MASS INDEX: 26.66 KG/M2

## 2020-02-17 DIAGNOSIS — Z00.00 ANNUAL PHYSICAL EXAM: Primary | ICD-10-CM

## 2020-02-17 PROCEDURE — 99396 PREV VISIT EST AGE 40-64: CPT | Performed by: INTERNAL MEDICINE

## 2020-02-17 NOTE — PROGRESS NOTES
Shawna Crawford is a 53year old male who presents for a complete physical exam.   HPI:   Pt complains of     Wt Readings from Last 3 Encounters:  02/17/20 : 180 lb (81.6 kg)  09/04/19 : 178 lb (80.7 kg)  11/07/18 : 178 lb 3.2 oz (80.8 kg)    Body mass ind Types: Cigars      Smokeless tobacco: Never Used      Tobacco comment: 3x cigars per year    Alcohol use: Yes      Comment: Occasionally     Drug use: No     Exercise:  twice per week.   Diet: watches minimally     REVIEW OF SYSTEMS:     Constitutional Michelle Weber Pulses - Dorsalis pedis: Normal. Bruits - Carotids: Absent. Extremity Normal No edema. No varicosities. Abdomen Normal Inspection - Normal. Auscultation - Normal. No abdominal tenderness.    Musculoskeletal Normal Overview - Normal.   Skin Normal Inspe

## 2020-02-19 ENCOUNTER — NURSE TRIAGE (OUTPATIENT)
Dept: INTERNAL MEDICINE CLINIC | Facility: CLINIC | Age: 45
End: 2020-02-19

## 2020-02-19 ENCOUNTER — OFFICE VISIT (OUTPATIENT)
Dept: INTERNAL MEDICINE CLINIC | Facility: CLINIC | Age: 45
End: 2020-02-19
Payer: COMMERCIAL

## 2020-02-19 VITALS
HEIGHT: 69 IN | RESPIRATION RATE: 18 BRPM | BODY MASS INDEX: 26.66 KG/M2 | TEMPERATURE: 98 F | SYSTOLIC BLOOD PRESSURE: 130 MMHG | WEIGHT: 180 LBS | DIASTOLIC BLOOD PRESSURE: 79 MMHG | HEART RATE: 103 BPM

## 2020-02-19 DIAGNOSIS — H00.011 HORDEOLUM EXTERNUM OF RIGHT UPPER EYELID: Primary | ICD-10-CM

## 2020-02-19 PROCEDURE — 99213 OFFICE O/P EST LOW 20 MIN: CPT | Performed by: INTERNAL MEDICINE

## 2020-02-19 RX ORDER — ERYTHROMYCIN 5 MG/G
OINTMENT OPHTHALMIC
Qty: 1 TUBE | Refills: 0 | Status: SHIPPED | OUTPATIENT
Start: 2020-02-19 | End: 2020-03-14 | Stop reason: ALTCHOICE

## 2020-02-19 NOTE — PATIENT INSTRUCTIONS
Right eye deya , advised him to use warm compress  few times during the day ,  Will send topical abx , if not better call us ,

## 2020-02-19 NOTE — TELEPHONE ENCOUNTER
Action Requested: Summary for Provider     []  Critical Lab, Recommendations Needed  [] Need Additional Advice  []   FYI    []   Need Orders  [] Need Medications Sent to Pharmacy  []  Other     SUMMARY: patient wants to be seen asap today.   Appointment mad

## 2020-02-19 NOTE — PROGRESS NOTES
HPI:    Patient ID: Krystyna Santos is a 53year old male. HPI He is here today complaining of itchiness and redness on his right eyelid. According to him , same thing happened to him in January at that time he had a stye and antibiotic was prescribed. Psychiatric/Behavioral: Negative for agitation, behavioral problems, confusion, hallucinations and sleep disturbance. The patient is not nervous/anxious.           Current Outpatient Medications   Medication Sig Dispense Refill   • erythromycin 5 MG/GM Opht Hordeolum externum of right upper eyelid  (primary encounter diagnosis) Ri advised him to use warm compress  few times during the day ,  Will send topical abx , if not better call us ,     No orders of the defined types were placed in this encounter.

## 2020-02-22 ENCOUNTER — LAB ENCOUNTER (OUTPATIENT)
Dept: LAB | Age: 45
End: 2020-02-22
Attending: INTERNAL MEDICINE
Payer: COMMERCIAL

## 2020-02-22 DIAGNOSIS — Z00.00 ANNUAL PHYSICAL EXAM: ICD-10-CM

## 2020-02-22 LAB
ALBUMIN SERPL-MCNC: 3.7 G/DL (ref 3.4–5)
ALBUMIN/GLOB SERPL: 1 {RATIO} (ref 1–2)
ALP LIVER SERPL-CCNC: 49 U/L (ref 45–117)
ALT SERPL-CCNC: 29 U/L (ref 16–61)
ANION GAP SERPL CALC-SCNC: 5 MMOL/L (ref 0–18)
AST SERPL-CCNC: 17 U/L (ref 15–37)
BASOPHILS # BLD AUTO: 0.06 X10(3) UL (ref 0–0.2)
BASOPHILS NFR BLD AUTO: 0.9 %
BILIRUB SERPL-MCNC: 0.5 MG/DL (ref 0.1–2)
BUN BLD-MCNC: 11 MG/DL (ref 7–18)
BUN/CREAT SERPL: 12 (ref 10–20)
CALCIUM BLD-MCNC: 8.8 MG/DL (ref 8.5–10.1)
CHLORIDE SERPL-SCNC: 106 MMOL/L (ref 98–112)
CHOLEST SMN-MCNC: 216 MG/DL (ref ?–200)
CO2 SERPL-SCNC: 30 MMOL/L (ref 21–32)
CREAT BLD-MCNC: 0.92 MG/DL (ref 0.7–1.3)
DEPRECATED RDW RBC AUTO: 38.3 FL (ref 35.1–46.3)
EOSINOPHIL # BLD AUTO: 0.4 X10(3) UL (ref 0–0.7)
EOSINOPHIL NFR BLD AUTO: 5.9 %
ERYTHROCYTE [DISTWIDTH] IN BLOOD BY AUTOMATED COUNT: 12.9 % (ref 11–15)
GLOBULIN PLAS-MCNC: 3.7 G/DL (ref 2.8–4.4)
GLUCOSE BLD-MCNC: 105 MG/DL (ref 70–99)
HCT VFR BLD AUTO: 46.1 % (ref 39–53)
HDLC SERPL-MCNC: 53 MG/DL (ref 40–59)
HGB BLD-MCNC: 15.8 G/DL (ref 13–17.5)
IMM GRANULOCYTES # BLD AUTO: 0.02 X10(3) UL (ref 0–1)
IMM GRANULOCYTES NFR BLD: 0.3 %
LDLC SERPL CALC-MCNC: 145 MG/DL (ref ?–100)
LYMPHOCYTES # BLD AUTO: 2.12 X10(3) UL (ref 1–4)
LYMPHOCYTES NFR BLD AUTO: 31.3 %
M PROTEIN MFR SERPL ELPH: 7.4 G/DL (ref 6.4–8.2)
MCH RBC QN AUTO: 28.2 PG (ref 26–34)
MCHC RBC AUTO-ENTMCNC: 34.3 G/DL (ref 31–37)
MCV RBC AUTO: 82.2 FL (ref 80–100)
MONOCYTES # BLD AUTO: 0.56 X10(3) UL (ref 0.1–1)
MONOCYTES NFR BLD AUTO: 8.3 %
NEUTROPHILS # BLD AUTO: 3.61 X10 (3) UL (ref 1.5–7.7)
NEUTROPHILS # BLD AUTO: 3.61 X10(3) UL (ref 1.5–7.7)
NEUTROPHILS NFR BLD AUTO: 53.3 %
NONHDLC SERPL-MCNC: 163 MG/DL (ref ?–130)
OSMOLALITY SERPL CALC.SUM OF ELEC: 292 MOSM/KG (ref 275–295)
PATIENT FASTING Y/N/NP: YES
PATIENT FASTING Y/N/NP: YES
PLATELET # BLD AUTO: 271 10(3)UL (ref 150–450)
POTASSIUM SERPL-SCNC: 4.8 MMOL/L (ref 3.5–5.1)
RBC # BLD AUTO: 5.61 X10(6)UL (ref 4.3–5.7)
SODIUM SERPL-SCNC: 141 MMOL/L (ref 136–145)
TRIGL SERPL-MCNC: 91 MG/DL (ref 30–149)
VIT B12 SERPL-MCNC: 480 PG/ML (ref 193–986)
VLDLC SERPL CALC-MCNC: 18 MG/DL (ref 0–30)
WBC # BLD AUTO: 6.8 X10(3) UL (ref 4–11)

## 2020-02-22 PROCEDURE — 82607 VITAMIN B-12: CPT

## 2020-02-22 PROCEDURE — 85025 COMPLETE CBC W/AUTO DIFF WBC: CPT

## 2020-02-22 PROCEDURE — 36415 COLL VENOUS BLD VENIPUNCTURE: CPT

## 2020-02-22 PROCEDURE — 80061 LIPID PANEL: CPT

## 2020-02-22 PROCEDURE — 80053 COMPREHEN METABOLIC PANEL: CPT

## 2020-02-26 ENCOUNTER — TELEPHONE (OUTPATIENT)
Dept: INTERNAL MEDICINE CLINIC | Facility: CLINIC | Age: 45
End: 2020-02-26

## 2020-02-26 ENCOUNTER — TELEPHONE (OUTPATIENT)
Dept: OTHER | Age: 45
End: 2020-02-26

## 2020-02-26 NOTE — TELEPHONE ENCOUNTER
Did anyone call this patient I did send telephone  encounters since lunchtime but I do not see any response yet? ?  Can you pls check

## 2020-02-26 NOTE — TELEPHONE ENCOUNTER
Spoke to Pt, stated the right eye lid is still a little red and a little tender to touch-stated he thought after 9 days of the rx you prescribed this should be some better

## 2020-02-26 NOTE — TELEPHONE ENCOUNTER
----- Message from Linda Salinas MD sent at 2/26/2020  1:43 PM CST -----  Regarding: FW: Test Results Question  Contact: [phone]      ----- Message -----  From: Trav Servin RN  Sent: 2/26/2020   1:20 PM CST  To: Linda Salinas MD  Subject:

## 2020-02-26 NOTE — TELEPHONE ENCOUNTER
I will send keflex  only for 5 days , if not better he needs to call me, I will refer him to see eye doctor

## 2020-02-26 NOTE — TELEPHONE ENCOUNTER
Carol Garcia MD   to [facility] Rn Triage         1:43 PM   Note      Can you please call the patient and ask what is going on with the his right eye is there any tenderness on the upper eyelid is there  any redness?  Swelling                  1:20 PM   [place]

## 2020-02-26 NOTE — TELEPHONE ENCOUNTER
Informed patient of medication sent per Dr. Maycol Wick to pharmacy. Pt voiced understanding and agrees.

## 2020-03-14 ENCOUNTER — OFFICE VISIT (OUTPATIENT)
Dept: DERMATOLOGY CLINIC | Facility: CLINIC | Age: 45
End: 2020-03-14
Payer: COMMERCIAL

## 2020-03-14 DIAGNOSIS — D23.30 BENIGN NEOPLASM OF SKIN OF FACE: ICD-10-CM

## 2020-03-14 DIAGNOSIS — D23.70 BENIGN NEOPLASM OF SKIN OF LOWER LIMB, INCLUDING HIP, UNSPECIFIED LATERALITY: ICD-10-CM

## 2020-03-14 DIAGNOSIS — D23.5 BENIGN NEOPLASM OF SKIN OF TRUNK, EXCEPT SCROTUM: ICD-10-CM

## 2020-03-14 DIAGNOSIS — D23.4 BENIGN NEOPLASM OF SCALP AND SKIN OF NECK: ICD-10-CM

## 2020-03-14 DIAGNOSIS — D22.9 MULTIPLE NEVI: Primary | ICD-10-CM

## 2020-03-14 DIAGNOSIS — D23.60 BENIGN NEOPLASM OF SKIN OF UPPER LIMB, INCLUDING SHOULDER, UNSPECIFIED LATERALITY: ICD-10-CM

## 2020-03-14 DIAGNOSIS — L82.1 OTHER SEBORRHEIC KERATOSIS: ICD-10-CM

## 2020-03-14 PROCEDURE — 99213 OFFICE O/P EST LOW 20 MIN: CPT | Performed by: DERMATOLOGY

## 2020-03-14 NOTE — PROGRESS NOTES
Tatianna Nicholas is a 53year old male. HPI:     CC:  Patient presents with:  Full Skin Exam: LOV 4/27/2019. Pt presenting for full body skin exam. Pt denies personal and family hx of skin cancer. Allergies:  Patient has no known allergies.     HIST Non-medical: Not on file    Tobacco Use      Smoking status: Current Some Day Smoker        Types: Cigars      Smokeless tobacco: Never Used      Tobacco comment: 3x cigars per year    Substance and Sexual Activity      Alcohol use: Yes        Comment: Occ presents with:  Full Skin Exam: LOV 4/27/2019. Pt presenting for full body skin exam. Pt denies personal and family hx of skin cancer. Past notes/ records and appropriate/relevant lab results including pathology and past body maps reviewed.  Updated an seborrheic keratosis  Benign neoplasm of scalp and skin of neck  Benign neoplasm of skin of face  Benign neoplasm of skin of lower limb, including hip, unspecified laterality  Benign neoplasm of skin of trunk, except scrotum  Benign neoplasm of skin of upp of these issues and agrees to the plan. The patient is asked to return as noted in follow-up/ above. This note was generated using Dragon voice recognition software. Please contact me regarding any confusion resulting from errors in recognition.

## 2020-06-04 RX ORDER — OMEPRAZOLE 40 MG/1
CAPSULE, DELAYED RELEASE ORAL
Qty: 90 CAPSULE | Refills: 1 | Status: SHIPPED | OUTPATIENT
Start: 2020-06-04 | End: 2020-09-27

## 2020-06-04 RX ORDER — LORATADINE 10 MG
TABLET ORAL
Qty: 165 TABLET | Refills: 0 | Status: SHIPPED
Start: 2020-06-04 | End: 2021-03-12

## 2020-07-11 ENCOUNTER — HOSPITAL ENCOUNTER (OUTPATIENT)
Dept: CT IMAGING | Facility: HOSPITAL | Age: 45
Discharge: HOME | End: 2020-07-11
Attending: INTERNAL MEDICINE
Payer: COMMERCIAL

## 2020-07-11 DIAGNOSIS — R91.8 LUNG NODULES: ICD-10-CM

## 2020-07-11 PROCEDURE — 71250 CT THORAX DX C-: CPT | Performed by: INTERNAL MEDICINE

## 2020-09-04 ENCOUNTER — TELEPHONE (OUTPATIENT)
Dept: INTERNAL MEDICINE CLINIC | Facility: CLINIC | Age: 45
End: 2020-09-04

## 2020-09-04 NOTE — TELEPHONE ENCOUNTER
Kimberlee Keller from [facility] requesting to see if request for patient records has been received, and forms they need from Dr. Mariah Anguiano. She was advised nothing received. She will be resending documents.  She was also provided with medical records number for any re

## 2020-09-05 ENCOUNTER — LAB ENCOUNTER (OUTPATIENT)
Dept: LAB | Facility: HOSPITAL | Age: 45
End: 2020-09-05
Attending: INTERNAL MEDICINE
Payer: COMMERCIAL

## 2020-09-05 DIAGNOSIS — E78.00 HYPERCHOLESTEROLEMIA: ICD-10-CM

## 2020-09-05 DIAGNOSIS — Z12.5 SCREENING FOR PROSTATE CANCER: ICD-10-CM

## 2020-09-05 LAB
CHOLEST SMN-MCNC: 200 MG/DL (ref ?–200)
COMPLEXED PSA SERPL-MCNC: 1 NG/ML (ref ?–4)
HDLC SERPL-MCNC: 48 MG/DL (ref 40–59)
LDLC SERPL CALC-MCNC: 138 MG/DL (ref ?–100)
NONHDLC SERPL-MCNC: 152 MG/DL (ref ?–130)
PATIENT FASTING Y/N/NP: YES
TRIGL SERPL-MCNC: 71 MG/DL (ref 30–149)
VLDLC SERPL CALC-MCNC: 14 MG/DL (ref 0–30)

## 2020-09-05 PROCEDURE — 36415 COLL VENOUS BLD VENIPUNCTURE: CPT

## 2020-09-05 PROCEDURE — 80061 LIPID PANEL: CPT

## 2020-09-27 RX ORDER — OMEPRAZOLE 40 MG/1
CAPSULE, DELAYED RELEASE ORAL
Qty: 90 CAPSULE | Refills: 1 | Status: SHIPPED | OUTPATIENT
Start: 2020-09-27 | End: 2021-05-31

## 2021-03-13 RX ORDER — LORATADINE 10 MG
1 TABLET ORAL DAILY
Qty: 165 TABLET | Refills: 0 | Status: SHIPPED
Start: 2021-03-13 | End: 2021-03-13

## 2021-03-14 RX ORDER — LORATADINE 10 MG
1 TABLET ORAL DAILY
Qty: 165 TABLET | Refills: 0 | Status: SHIPPED
Start: 2021-03-14 | End: 2021-03-15

## 2021-03-15 RX ORDER — LORATADINE 10 MG
1 TABLET ORAL DAILY
Qty: 165 TABLET | Refills: 0 | Status: SHIPPED
Start: 2021-03-15 | End: 2021-03-18

## 2021-03-15 NOTE — TELEPHONE ENCOUNTER
Please advise   E-Prescribing Status: Transmission to pharmacy failed (3/14/2021  1:40 PM CDT)    I pended the medication again. Can you please try and resend.   Thanks.      ----- Message -----   From: Salena Hook   Sent: 3/13/2021   1:54 PM CDT   To:

## 2021-03-19 RX ORDER — LORATADINE 10 MG
1 TABLET ORAL DAILY
Qty: 165 TABLET | Refills: 0 | Status: SHIPPED
Start: 2021-03-19 | End: 2021-03-19

## 2021-05-31 RX ORDER — OMEPRAZOLE 40 MG/1
CAPSULE, DELAYED RELEASE ORAL
Qty: 90 CAPSULE | Refills: 1 | Status: SHIPPED | OUTPATIENT
Start: 2021-05-31 | End: 2022-01-13

## 2021-07-15 RX ORDER — LORATADINE 10 MG
1 TABLET ORAL DAILY
Qty: 165 TABLET | Refills: 0 | OUTPATIENT
Start: 2021-07-15

## 2021-07-19 ENCOUNTER — OFFICE VISIT (OUTPATIENT)
Dept: INTERNAL MEDICINE CLINIC | Facility: CLINIC | Age: 46
End: 2021-07-19
Payer: COMMERCIAL

## 2021-07-19 VITALS
WEIGHT: 177 LBS | HEART RATE: 105 BPM | OXYGEN SATURATION: 95 % | DIASTOLIC BLOOD PRESSURE: 87 MMHG | BODY MASS INDEX: 26.22 KG/M2 | SYSTOLIC BLOOD PRESSURE: 135 MMHG | HEIGHT: 69 IN

## 2021-07-19 DIAGNOSIS — Z12.11 SCREENING FOR COLON CANCER: ICD-10-CM

## 2021-07-19 DIAGNOSIS — Z00.00 ANNUAL PHYSICAL EXAM: Primary | ICD-10-CM

## 2021-07-19 PROCEDURE — 3008F BODY MASS INDEX DOCD: CPT | Performed by: INTERNAL MEDICINE

## 2021-07-19 PROCEDURE — 99396 PREV VISIT EST AGE 40-64: CPT | Performed by: INTERNAL MEDICINE

## 2021-07-19 PROCEDURE — 3075F SYST BP GE 130 - 139MM HG: CPT | Performed by: INTERNAL MEDICINE

## 2021-07-19 PROCEDURE — 3079F DIAST BP 80-89 MM HG: CPT | Performed by: INTERNAL MEDICINE

## 2021-07-19 RX ORDER — LORATADINE 10 MG
1 TABLET ORAL DAILY
Qty: 165 TABLET | Refills: 0 | Status: SHIPPED
Start: 2021-07-19 | End: 2021-07-21

## 2021-07-19 RX ORDER — LORATADINE 10 MG
1 TABLET ORAL DAILY
Qty: 165 TABLET | Refills: 0 | Status: SHIPPED
Start: 2021-07-19 | End: 2021-07-19

## 2021-07-19 NOTE — PROGRESS NOTES
Shawna Crawford is a 50year old male who presents for a complete physical exam.   HPI:   Pt has no complains    Wt Readings from Last 3 Encounters:  07/19/21 : 177 lb (80.3 kg)  02/19/20 : 180 lb (81.6 kg)  02/17/20 : 180 lb (81.6 kg)    Body mass index i Yes      Comment: Occasionally     Drug use: No     Exercise: three times per week. Diet: watches calories closely     REVIEW OF SYSTEMS:     Constitutional Negative Chills, fatigue and fever.    ENMT Negative Hearing loss, nasal drainage, pain in/around e Carotids: Absent. Extremity Normal No edema. No varicosities. Abdomen Normal Inspection - Normal. Auscultation - Normal. No abdominal tenderness.    Musculoskeletal Normal Overview - Normal.   Skin Normal Inspection - Normal.   Neurological Normal Francisco

## 2021-07-21 RX ORDER — LORATADINE 10 MG
1 TABLET ORAL DAILY
Qty: 165 TABLET | Refills: 0 | Status: SHIPPED
Start: 2021-07-21 | End: 2022-01-17

## 2021-07-27 ENCOUNTER — LAB ENCOUNTER (OUTPATIENT)
Dept: LAB | Age: 46
End: 2021-07-27
Attending: INTERNAL MEDICINE
Payer: COMMERCIAL

## 2021-07-27 DIAGNOSIS — Z00.00 ANNUAL PHYSICAL EXAM: ICD-10-CM

## 2021-07-27 LAB
ALBUMIN SERPL-MCNC: 3.9 G/DL (ref 3.4–5)
ALBUMIN/GLOB SERPL: 1.1 {RATIO} (ref 1–2)
ALP LIVER SERPL-CCNC: 48 U/L
ALT SERPL-CCNC: 29 U/L
ANION GAP SERPL CALC-SCNC: 4 MMOL/L (ref 0–18)
AST SERPL-CCNC: 18 U/L (ref 15–37)
BASOPHILS # BLD AUTO: 0.06 X10(3) UL (ref 0–0.2)
BASOPHILS NFR BLD AUTO: 1.1 %
BILIRUB SERPL-MCNC: 0.5 MG/DL (ref 0.1–2)
BUN BLD-MCNC: 20 MG/DL (ref 7–18)
BUN/CREAT SERPL: 23.5 (ref 10–20)
CALCIUM BLD-MCNC: 9.1 MG/DL (ref 8.5–10.1)
CHLORIDE SERPL-SCNC: 108 MMOL/L (ref 98–112)
CHOLEST SMN-MCNC: 192 MG/DL (ref ?–200)
CO2 SERPL-SCNC: 29 MMOL/L (ref 21–32)
CREAT BLD-MCNC: 0.85 MG/DL
DEPRECATED RDW RBC AUTO: 38.5 FL (ref 35.1–46.3)
EOSINOPHIL # BLD AUTO: 0.21 X10(3) UL (ref 0–0.7)
EOSINOPHIL NFR BLD AUTO: 3.7 %
ERYTHROCYTE [DISTWIDTH] IN BLOOD BY AUTOMATED COUNT: 12.6 % (ref 11–15)
GLOBULIN PLAS-MCNC: 3.4 G/DL (ref 2.8–4.4)
GLUCOSE BLD-MCNC: 106 MG/DL (ref 70–99)
HCT VFR BLD AUTO: 44.1 %
HDLC SERPL-MCNC: 51 MG/DL (ref 40–59)
HGB BLD-MCNC: 14.9 G/DL
IMM GRANULOCYTES # BLD AUTO: 0.02 X10(3) UL (ref 0–1)
IMM GRANULOCYTES NFR BLD: 0.4 %
LDLC SERPL CALC-MCNC: 125 MG/DL (ref ?–100)
LYMPHOCYTES # BLD AUTO: 1.83 X10(3) UL (ref 1–4)
LYMPHOCYTES NFR BLD AUTO: 32.3 %
M PROTEIN MFR SERPL ELPH: 7.3 G/DL (ref 6.4–8.2)
MCH RBC QN AUTO: 28.2 PG (ref 26–34)
MCHC RBC AUTO-ENTMCNC: 33.8 G/DL (ref 31–37)
MCV RBC AUTO: 83.4 FL
MONOCYTES # BLD AUTO: 0.48 X10(3) UL (ref 0.1–1)
MONOCYTES NFR BLD AUTO: 8.5 %
NEUTROPHILS # BLD AUTO: 3.07 X10 (3) UL (ref 1.5–7.7)
NEUTROPHILS # BLD AUTO: 3.07 X10(3) UL (ref 1.5–7.7)
NEUTROPHILS NFR BLD AUTO: 54 %
NONHDLC SERPL-MCNC: 141 MG/DL (ref ?–130)
OSMOLALITY SERPL CALC.SUM OF ELEC: 295 MOSM/KG (ref 275–295)
PATIENT FASTING Y/N/NP: YES
PATIENT FASTING Y/N/NP: YES
PLATELET # BLD AUTO: 247 10(3)UL (ref 150–450)
POTASSIUM SERPL-SCNC: 4.7 MMOL/L (ref 3.5–5.1)
RBC # BLD AUTO: 5.29 X10(6)UL
SODIUM SERPL-SCNC: 141 MMOL/L (ref 136–145)
TRIGL SERPL-MCNC: 87 MG/DL (ref 30–149)
VLDLC SERPL CALC-MCNC: 15 MG/DL (ref 0–30)
WBC # BLD AUTO: 5.7 X10(3) UL (ref 4–11)

## 2021-07-27 PROCEDURE — 80053 COMPREHEN METABOLIC PANEL: CPT

## 2021-07-27 PROCEDURE — 80061 LIPID PANEL: CPT

## 2021-07-27 PROCEDURE — 85025 COMPLETE CBC W/AUTO DIFF WBC: CPT

## 2021-07-27 PROCEDURE — 36415 COLL VENOUS BLD VENIPUNCTURE: CPT

## 2022-01-13 RX ORDER — OMEPRAZOLE 40 MG/1
40 CAPSULE, DELAYED RELEASE ORAL
Qty: 90 CAPSULE | Refills: 1 | OUTPATIENT
Start: 2022-01-13

## 2022-01-13 RX ORDER — OMEPRAZOLE 40 MG/1
CAPSULE, DELAYED RELEASE ORAL
Qty: 90 CAPSULE | Refills: 1 | Status: SHIPPED | OUTPATIENT
Start: 2022-01-13 | End: 2022-07-13

## 2022-01-13 NOTE — TELEPHONE ENCOUNTER
Refill passed per [facility] [employer] protocol.   Requested Prescriptions   Pending Prescriptions Disp Refills    OMEPRAZOLE 40 MG Oral Capsule Delayed Release [Pharmacy Med Name: OMEPRAZOLE 40MG CAPSULES] 90 capsule 1     Sig: TAKE 1 CAPSULE BY MOUTH ONCE DAILY BEFORE A MEAL        Gastrointestional Medication Protocol Passed - 1/13/2022 10:20 AM        Passed - Appointment in past 12 or next 3 months               Recent Outpatient Visits              5 months ago Annual physical exam    Yves Hudson MD    Office Visit    1 year ago [facility] Mavis Perez MD    Office Visit    1 year ago Hordeolum externum of right upper eyelid    Darell Frazier MD    Office Visit    1 year ago Annual physical exam    Yves Hudson MD    Office Visit    2 years ago Acute right-sided low back pain with sciatica, sciatica laterality unspecified    Darell Frazier MD    Office Visit

## 2022-01-17 RX ORDER — LORATADINE 10 MG
1 TABLET ORAL DAILY
Qty: 90 TABLET | Refills: 1 | Status: SHIPPED
Start: 2022-01-17

## 2022-03-18 ENCOUNTER — TELEPHONE (OUTPATIENT)
Dept: INTERNAL MEDICINE CLINIC | Facility: CLINIC | Age: 47
End: 2022-03-18

## 2022-03-18 RX ORDER — LORATADINE 10 MG
1 TABLET ORAL DAILY
Qty: 90 TABLET | Refills: 1 | Status: SHIPPED
Start: 2022-03-18

## 2022-03-21 NOTE — TELEPHONE ENCOUNTER
Pt calling stating that pharmacy never received this script and that they will be sending over a new refill request for pts medication. Pt looking for refill asap. Please advise.

## 2022-03-23 RX ORDER — LORATADINE 10 MG
1 TABLET ORAL DAILY
Qty: 90 TABLET | Refills: 1 | OUTPATIENT
Start: 2022-03-23

## 2022-05-13 ENCOUNTER — TELEPHONE (OUTPATIENT)
Dept: GASTROENTEROLOGY | Facility: CLINIC | Age: 47
End: 2022-05-13

## 2022-05-13 NOTE — TELEPHONE ENCOUNTER
Called patient to verify TCS eligibility.  lmtcb    Phone room- if patient calls back please verify if he qualifies for a TCS appointment if YES then please proceed with scheduling a tcs appointment and cancel OV with Emely Cross    Thank you

## 2022-05-24 ENCOUNTER — NURSE ONLY (OUTPATIENT)
Dept: GASTROENTEROLOGY | Facility: CLINIC | Age: 47
End: 2022-05-24

## 2022-05-24 DIAGNOSIS — Z12.11 SCREEN FOR COLON CANCER: Primary | ICD-10-CM

## 2022-05-24 NOTE — PROGRESS NOTES
Dr. Nevin Hagen,     Called patient for a scheduled telephone colon screening. GI MD preference: youngest MD  Insurance:  BCBS  CBC from: 7/27/2021 show no signs of anemia   PCP visit on: 7/19/2021    First colonoscopy: yes     Anticoagulants: no   Diabetic Meds:  No   BP meds(Ace inhibitors/ARB's): no   Weight loss meds (phentermine/vyvanse): no   Iron supplement (RX/OTC): no   Height & Weight/BMI: 5'9\"/177lbs/26  Hx of Cardiac/CVA issues/(MI/Stroke): no   Devices Pacemaker/Defibrillator/Stents: no   Resp. Issues/Oxygen Use/PURVI/COPD: no   Issues w/Anesthesia: no     Symptoms (Y/N): no     Family history of colon cancer: no     Medications, pharmacy, and allergies verified with patient over the phone. Please advise on orders and prep, thank you.

## 2022-06-10 NOTE — PROGRESS NOTES
Original TCS appointment sent to Dr. Lay Del Real on 5/24/022 and still does not have orders; approval given from supervisor to route to alternate provider to obtain orders. Alejandrina -    Please see below and provide orders, thank you!

## 2022-06-14 RX ORDER — POLYETHYLENE GLYCOL 3350, SODIUM CHLORIDE, SODIUM BICARBONATE, POTASSIUM CHLORIDE 420; 11.2; 5.72; 1.48 G/4L; G/4L; G/4L; G/4L
POWDER, FOR SOLUTION ORAL
Qty: 4000 ML | Refills: 0 | Status: SHIPPED | OUTPATIENT
Start: 2022-06-14

## 2022-06-14 NOTE — PROGRESS NOTES
Scheduling:  cln w/ IV or MAC w/ Dr. Janak Shultz  Dx: crc screening  trilyte split dose sent e-scribe

## 2022-06-15 NOTE — PROGRESS NOTES
Scheduled for:  Colonoscopy-screen 54317    Provider Name:  Dr. Chamorro Credit  Date:  8/24/2022  Location:  [facility]  Sedation:  MAC  Time:  2:00PM (pt is aware to arrive at 1:00PM)  Prep:  Split dose trilyte E-Prescribing Status: Receipt confirmed by pharmacy (6/14/2022 11:10 AM CDT)  Meds/Allergies Reconciled?:  lazaro Garcia- reviewed  Diagnosis with codes:  Colorectal cancer screening Z12.11  Was patient informed to call insurance with codes (Y/N):  I confirmed [employer] with this patient. Referral sent?:  Referral was sent at the time of electronic surgical scheduling. [address] [facility] or [id number]  notified?:  I sent an electronic request to Endo Scheduling and received a confirmation today. Medication Orders:  n/a  Misc Orders:  Patient was informed that they will need a COVID 19 test prior to their procedure. Patient verbally understood & will await a phone call from [facility] to schedule. Further instructions given by staff:  I discussed the prep instructions with the patient which he verbally understood and is aware that I will send the instructions today via [address]

## 2022-07-13 ENCOUNTER — OFFICE VISIT (OUTPATIENT)
Dept: INTERNAL MEDICINE CLINIC | Facility: CLINIC | Age: 47
End: 2022-07-13
Payer: COMMERCIAL

## 2022-07-13 VITALS
WEIGHT: 176 LBS | TEMPERATURE: 98 F | SYSTOLIC BLOOD PRESSURE: 135 MMHG | OXYGEN SATURATION: 97 % | HEART RATE: 91 BPM | BODY MASS INDEX: 26.07 KG/M2 | HEIGHT: 69 IN | DIASTOLIC BLOOD PRESSURE: 88 MMHG

## 2022-07-13 DIAGNOSIS — Z00.00 ANNUAL PHYSICAL EXAM: Primary | ICD-10-CM

## 2022-07-13 DIAGNOSIS — E53.8 B12 DEFICIENCY: ICD-10-CM

## 2022-07-13 PROCEDURE — 3079F DIAST BP 80-89 MM HG: CPT | Performed by: INTERNAL MEDICINE

## 2022-07-13 PROCEDURE — 3008F BODY MASS INDEX DOCD: CPT | Performed by: INTERNAL MEDICINE

## 2022-07-13 PROCEDURE — 3075F SYST BP GE 130 - 139MM HG: CPT | Performed by: INTERNAL MEDICINE

## 2022-07-13 PROCEDURE — 99396 PREV VISIT EST AGE 40-64: CPT | Performed by: INTERNAL MEDICINE

## 2022-07-13 RX ORDER — CHLORAL HYDRATE 500 MG
1000 CAPSULE ORAL DAILY
COMMUNITY

## 2022-07-13 RX ORDER — LORATADINE 10 MG
1 TABLET ORAL DAILY
Qty: 90 TABLET | Refills: 1 | Status: SHIPPED
Start: 2022-07-13

## 2022-07-13 RX ORDER — OMEPRAZOLE 40 MG/1
CAPSULE, DELAYED RELEASE ORAL
Qty: 90 CAPSULE | Refills: 1 | Status: SHIPPED | OUTPATIENT
Start: 2022-07-13

## 2022-07-21 ENCOUNTER — LAB ENCOUNTER (OUTPATIENT)
Dept: LAB | Age: 47
End: 2022-07-21
Attending: INTERNAL MEDICINE
Payer: COMMERCIAL

## 2022-07-21 DIAGNOSIS — E53.8 B12 DEFICIENCY: ICD-10-CM

## 2022-07-21 DIAGNOSIS — Z00.00 ANNUAL PHYSICAL EXAM: ICD-10-CM

## 2022-07-21 LAB
ALBUMIN SERPL-MCNC: 3.7 G/DL (ref 3.4–5)
ALBUMIN/GLOB SERPL: 1.2 {RATIO} (ref 1–2)
ALP LIVER SERPL-CCNC: 47 U/L
ALT SERPL-CCNC: 24 U/L
ANION GAP SERPL CALC-SCNC: 5 MMOL/L (ref 0–18)
AST SERPL-CCNC: 13 U/L (ref 15–37)
BASOPHILS # BLD AUTO: 0.07 X10(3) UL (ref 0–0.2)
BASOPHILS NFR BLD AUTO: 1 %
BILIRUB SERPL-MCNC: 0.5 MG/DL (ref 0.1–2)
BUN BLD-MCNC: 18 MG/DL (ref 7–18)
BUN/CREAT SERPL: 19.1 (ref 10–20)
CALCIUM BLD-MCNC: 8.9 MG/DL (ref 8.5–10.1)
CHLORIDE SERPL-SCNC: 107 MMOL/L (ref 98–112)
CHOLEST SERPL-MCNC: 181 MG/DL (ref ?–200)
CO2 SERPL-SCNC: 28 MMOL/L (ref 21–32)
COMPLEXED PSA SERPL-MCNC: 0.98 NG/ML (ref ?–4)
CREAT BLD-MCNC: 0.94 MG/DL
DEPRECATED RDW RBC AUTO: 39.4 FL (ref 35.1–46.3)
EOSINOPHIL # BLD AUTO: 0.25 X10(3) UL (ref 0–0.7)
EOSINOPHIL NFR BLD AUTO: 3.6 %
ERYTHROCYTE [DISTWIDTH] IN BLOOD BY AUTOMATED COUNT: 12.7 % (ref 11–15)
FASTING PATIENT LIPID ANSWER: YES
FASTING STATUS PATIENT QL REPORTED: YES
GLOBULIN PLAS-MCNC: 3.2 G/DL (ref 2.8–4.4)
GLUCOSE BLD-MCNC: 99 MG/DL (ref 70–99)
HCT VFR BLD AUTO: 47.3 %
HDLC SERPL-MCNC: 48 MG/DL (ref 40–59)
HGB BLD-MCNC: 15.6 G/DL
IMM GRANULOCYTES # BLD AUTO: 0.04 X10(3) UL (ref 0–1)
IMM GRANULOCYTES NFR BLD: 0.6 %
LDLC SERPL CALC-MCNC: 117 MG/DL (ref ?–100)
LYMPHOCYTES # BLD AUTO: 2.17 X10(3) UL (ref 1–4)
LYMPHOCYTES NFR BLD AUTO: 31.4 %
MCH RBC QN AUTO: 28.3 PG (ref 26–34)
MCHC RBC AUTO-ENTMCNC: 33 G/DL (ref 31–37)
MCV RBC AUTO: 85.8 FL
MONOCYTES # BLD AUTO: 0.54 X10(3) UL (ref 0.1–1)
MONOCYTES NFR BLD AUTO: 7.8 %
NEUTROPHILS # BLD AUTO: 3.85 X10 (3) UL (ref 1.5–7.7)
NEUTROPHILS # BLD AUTO: 3.85 X10(3) UL (ref 1.5–7.7)
NEUTROPHILS NFR BLD AUTO: 55.6 %
NONHDLC SERPL-MCNC: 133 MG/DL (ref ?–130)
OSMOLALITY SERPL CALC.SUM OF ELEC: 292 MOSM/KG (ref 275–295)
PLATELET # BLD AUTO: 267 10(3)UL (ref 150–450)
POTASSIUM SERPL-SCNC: 4.2 MMOL/L (ref 3.5–5.1)
PROT SERPL-MCNC: 6.9 G/DL (ref 6.4–8.2)
RBC # BLD AUTO: 5.51 X10(6)UL
SODIUM SERPL-SCNC: 140 MMOL/L (ref 136–145)
TRIGL SERPL-MCNC: 86 MG/DL (ref 30–149)
TSI SER-ACNC: 1.06 MIU/ML (ref 0.36–3.74)
VIT B12 SERPL-MCNC: 364 PG/ML (ref 193–986)
VLDLC SERPL CALC-MCNC: 15 MG/DL (ref 0–30)
WBC # BLD AUTO: 6.9 X10(3) UL (ref 4–11)

## 2022-07-21 PROCEDURE — 36415 COLL VENOUS BLD VENIPUNCTURE: CPT

## 2022-07-21 PROCEDURE — 82607 VITAMIN B-12: CPT

## 2022-07-21 PROCEDURE — 85025 COMPLETE CBC W/AUTO DIFF WBC: CPT

## 2022-07-21 PROCEDURE — 80053 COMPREHEN METABOLIC PANEL: CPT

## 2022-07-21 PROCEDURE — 80061 LIPID PANEL: CPT

## 2022-07-21 PROCEDURE — 84443 ASSAY THYROID STIM HORMONE: CPT

## 2022-08-24 ENCOUNTER — ANESTHESIA EVENT (OUTPATIENT)
Dept: ENDOSCOPY | Facility: HOSPITAL | Age: 47
End: 2022-08-24
Payer: COMMERCIAL

## 2022-08-24 ENCOUNTER — HOSPITAL ENCOUNTER (OUTPATIENT)
Facility: HOSPITAL | Age: 47
Setting detail: HOSPITAL OUTPATIENT SURGERY
Discharge: HOME | End: 2022-08-24
Attending: INTERNAL MEDICINE | Admitting: INTERNAL MEDICINE
Payer: COMMERCIAL

## 2022-08-24 ENCOUNTER — ANESTHESIA (OUTPATIENT)
Dept: ENDOSCOPY | Facility: HOSPITAL | Age: 47
End: 2022-08-24
Payer: COMMERCIAL

## 2022-08-24 VITALS
HEIGHT: 69 IN | OXYGEN SATURATION: 97 % | SYSTOLIC BLOOD PRESSURE: 130 MMHG | HEART RATE: 77 BPM | DIASTOLIC BLOOD PRESSURE: 102 MMHG | TEMPERATURE: 98 F | BODY MASS INDEX: 25.48 KG/M2 | WEIGHT: 172 LBS | RESPIRATION RATE: 18 BRPM

## 2022-08-24 DIAGNOSIS — Z12.11 SCREEN FOR COLON CANCER: ICD-10-CM

## 2022-08-24 PROCEDURE — 0DBL8ZX EXCISION OF TRANSVERSE COLON, VIA NATURAL OR ARTIFICIAL OPENING ENDOSCOPIC, DIAGNOSTIC: ICD-10-PCS | Performed by: INTERNAL MEDICINE

## 2022-08-24 PROCEDURE — 45385 COLONOSCOPY W/LESION REMOVAL: CPT | Performed by: INTERNAL MEDICINE

## 2022-08-24 PROCEDURE — 0DBM8ZX EXCISION OF DESCENDING COLON, VIA NATURAL OR ARTIFICIAL OPENING ENDOSCOPIC, DIAGNOSTIC: ICD-10-PCS | Performed by: INTERNAL MEDICINE

## 2022-08-24 PROCEDURE — 45380 COLONOSCOPY AND BIOPSY: CPT | Performed by: INTERNAL MEDICINE

## 2022-08-24 PROCEDURE — 0DBN8ZX EXCISION OF SIGMOID COLON, VIA NATURAL OR ARTIFICIAL OPENING ENDOSCOPIC, DIAGNOSTIC: ICD-10-PCS | Performed by: INTERNAL MEDICINE

## 2022-08-24 RX ORDER — SODIUM CHLORIDE, SODIUM LACTATE, POTASSIUM CHLORIDE, CALCIUM CHLORIDE 600; 310; 30; 20 MG/100ML; MG/100ML; MG/100ML; MG/100ML
INJECTION, SOLUTION INTRAVENOUS CONTINUOUS
OUTPATIENT
Start: 2022-08-24

## 2022-08-24 RX ORDER — SODIUM CHLORIDE, SODIUM LACTATE, POTASSIUM CHLORIDE, CALCIUM CHLORIDE 600; 310; 30; 20 MG/100ML; MG/100ML; MG/100ML; MG/100ML
INJECTION, SOLUTION INTRAVENOUS CONTINUOUS
Status: DISCONTINUED | OUTPATIENT
Start: 2022-08-24 | End: 2022-08-24

## 2022-08-24 RX ORDER — LIDOCAINE HYDROCHLORIDE 10 MG/ML
INJECTION, SOLUTION EPIDURAL; INFILTRATION; INTRACAUDAL; PERINEURAL AS NEEDED
Status: DISCONTINUED | OUTPATIENT
Start: 2022-08-24 | End: 2022-08-24 | Stop reason: SURG

## 2022-08-24 RX ORDER — NALOXONE HYDROCHLORIDE 0.4 MG/ML
80 INJECTION, SOLUTION INTRAMUSCULAR; INTRAVENOUS; SUBCUTANEOUS AS NEEDED
OUTPATIENT
Start: 2022-08-24 | End: 2022-08-24

## 2022-08-24 RX ADMIN — SODIUM CHLORIDE, SODIUM LACTATE, POTASSIUM CHLORIDE, CALCIUM CHLORIDE: 600; 310; 30; 20 INJECTION, SOLUTION INTRAVENOUS at 15:24:00

## 2022-08-24 RX ADMIN — SODIUM CHLORIDE, SODIUM LACTATE, POTASSIUM CHLORIDE, CALCIUM CHLORIDE: 600; 310; 30; 20 INJECTION, SOLUTION INTRAVENOUS at 14:51:00

## 2022-08-24 RX ADMIN — LIDOCAINE HYDROCHLORIDE 50 MG: 10 INJECTION, SOLUTION EPIDURAL; INFILTRATION; INTRACAUDAL; PERINEURAL at 14:51:00

## 2022-09-19 ENCOUNTER — TELEPHONE (OUTPATIENT)
Dept: GASTROENTEROLOGY | Facility: CLINIC | Age: 47
End: 2022-09-19

## 2022-10-10 RX ORDER — LORATADINE 10 MG
1 TABLET ORAL DAILY
Qty: 90 TABLET | Refills: 1 | Status: SHIPPED
Start: 2022-10-10

## 2022-10-10 RX ORDER — LORATADINE 10 MG
1 TABLET ORAL DAILY
Qty: 90 TABLET | Refills: 1 | OUTPATIENT
Start: 2022-10-10

## 2022-10-12 RX ORDER — LORATADINE 10 MG
1 TABLET ORAL DAILY
Qty: 90 TABLET | Refills: 1 | Status: SHIPPED
Start: 2022-10-12 | End: 2022-10-13

## 2022-10-13 RX ORDER — LORATADINE 10 MG
1 TABLET ORAL DAILY
Qty: 90 TABLET | Refills: 1 | Status: SHIPPED
Start: 2022-10-13

## 2022-10-18 RX ORDER — LORATADINE 10 MG
1 TABLET ORAL DAILY
Qty: 90 TABLET | Refills: 1 | Status: SHIPPED
Start: 2022-10-18

## 2022-10-18 NOTE — TELEPHONE ENCOUNTER
90 day supply with 1 additional refill sent on 10/13. Pharmacy contacted. They did not receive rx, was documented as faxed. Dr. Astrid Lambert can you please sign off on rx to be sent electronically.

## 2023-02-04 ENCOUNTER — OFFICE VISIT (OUTPATIENT)
Dept: INTERNAL MEDICINE CLINIC | Facility: CLINIC | Age: 48
End: 2023-02-04

## 2023-02-04 VITALS
DIASTOLIC BLOOD PRESSURE: 82 MMHG | BODY MASS INDEX: 26.39 KG/M2 | TEMPERATURE: 98 F | HEART RATE: 106 BPM | HEIGHT: 69 IN | SYSTOLIC BLOOD PRESSURE: 132 MMHG | WEIGHT: 178.19 LBS

## 2023-02-04 DIAGNOSIS — J06.9 ACUTE URI: Primary | ICD-10-CM

## 2023-02-04 PROCEDURE — 3075F SYST BP GE 130 - 139MM HG: CPT | Performed by: INTERNAL MEDICINE

## 2023-02-04 PROCEDURE — 99213 OFFICE O/P EST LOW 20 MIN: CPT | Performed by: INTERNAL MEDICINE

## 2023-02-04 PROCEDURE — 3079F DIAST BP 80-89 MM HG: CPT | Performed by: INTERNAL MEDICINE

## 2023-02-04 PROCEDURE — 3008F BODY MASS INDEX DOCD: CPT | Performed by: INTERNAL MEDICINE

## 2023-04-22 ENCOUNTER — OFFICE VISIT (OUTPATIENT)
Dept: DERMATOLOGY CLINIC | Facility: CLINIC | Age: 48
End: 2023-04-22

## 2023-04-22 DIAGNOSIS — D23.5 BENIGN NEOPLASM OF SKIN OF TRUNK: ICD-10-CM

## 2023-04-22 DIAGNOSIS — L82.1 SK (SEBORRHEIC KERATOSIS): Primary | ICD-10-CM

## 2023-04-22 DIAGNOSIS — D22.9 MULTIPLE NEVI: ICD-10-CM

## 2023-04-22 DIAGNOSIS — D23.4 BENIGN NEOPLASM OF SCALP AND SKIN OF NECK: ICD-10-CM

## 2023-04-22 DIAGNOSIS — D23.60 BENIGN NEOPLASM OF SKIN OF UPPER LIMB, INCLUDING SHOULDER, UNSPECIFIED LATERALITY: ICD-10-CM

## 2023-04-22 DIAGNOSIS — L81.4 LENTIGO: ICD-10-CM

## 2023-04-22 DIAGNOSIS — D23.30 BENIGN NEOPLASM OF SKIN OF FACE: ICD-10-CM

## 2023-04-22 PROCEDURE — 99213 OFFICE O/P EST LOW 20 MIN: CPT | Performed by: DERMATOLOGY

## 2023-05-08 NOTE — PROGRESS NOTES
Tiffany Durand is a 38year old male who presents for a complete physical exam.   HPI:   Pt states that for last few weeks he  Is having problem with urination.  He wakes up earlier to go bathroom and sometimes he has urge to urinate but he  Can not urinat Packs/day: 0.00      Years: 0.00         Types: Cigars  Smokeless tobacco: Never Used                      Comment: 3x cigars per year  Alcohol use: Yes              Comment: Occasionally      Exercise: once per week.   Diet: watches minimally rales   Cardiovascular Normal Regular rate and rhythm. No murmurs, gallops, or rubs   Vascular Normal Pulses - Dorsalis pedis: Normal. Bruits - Carotids: Absent. Extremity Normal No edema. No varicosities.    Abdomen Normal Inspection - Normal. Auscultat Detail Level: Detailed Depth Of Biopsy: dermis Was A Bandage Applied: Yes Size Of Lesion In Cm: 0.7 X Size Of Lesion In Cm: 0 Biopsy Type: H and E Biopsy Method: Personna blade Anesthesia Type: 1% lidocaine without epinephrine Anesthesia Volume In Cc (Will Not Render If 0): 0.5 Hemostasis: Aluminum Chloride Wound Care: Petrolatum Dressing: bandage Destruction After The Procedure: No Type Of Destruction Used: Curettage Lab: 6 Lab Facility: 3 Billing Type: Third-Party Bill Information: Selecting Yes will display possible errors in your note based on the variables you have selected. This validation is only offered as a suggestion for you. PLEASE NOTE THAT THE VALIDATION TEXT WILL BE REMOVED WHEN YOU FINALIZE YOUR NOTE. IF YOU WANT TO FAX A PRELIMINARY NOTE YOU WILL NEED TO TOGGLE THIS TO 'NO' IF YOU DO NOT WANT IT IN YOUR FAXED NOTE.

## 2023-05-31 RX ORDER — LORATADINE 10 MG
1 TABLET ORAL DAILY
Qty: 90 TABLET | Refills: 1 | Status: SHIPPED
Start: 2023-05-31 | End: 2023-06-02

## 2023-05-31 NOTE — TELEPHONE ENCOUNTER
Please review. Protocol failed / No protocol. Requested Prescriptions   Pending Prescriptions Disp Refills    loratadine-pseudoephedrine ER (WAL-ITIN D 24 HOUR)  MG Oral Tablet 24 Hr 90 tablet 1     Sig: Take 1 tablet by mouth daily.        There is no refill protocol information for this order          Recent Outpatient Visits              1 month ago SK (seborrheic keratosis)    Ann Huffman MD    Office Visit    3 months ago Acute URI    Michael Huffman MD    Office Visit    10 months ago Annual physical exam    [address] [facility], Smith Carlisle MD    Office Visit    1 year ago Screen for colon cancer    Sofia Hines, [address], [place]    Nurse Only    1 year ago [facility] exam    [address] [facility], Smith Carlisle MD    Office Visit

## 2023-06-05 RX ORDER — LORATADINE/PSEUDOEPHEDRINE 10MG-240MG
TABLET, EXTENDED RELEASE 24 HR ORAL
Qty: 90 TABLET | Refills: 1 | OUTPATIENT
Start: 2023-06-05

## 2023-06-05 RX ORDER — LORATADINE 10 MG
1 TABLET ORAL DAILY
Qty: 90 TABLET | Refills: 1 | Status: SHIPPED
Start: 2023-06-05

## 2023-06-20 NOTE — TELEPHONE ENCOUNTER
Please review; protocol failed. Faxed failed ,cannot send this RX due to the nature of the medication     Requested Prescriptions   Pending Prescriptions Disp Refills    loratadine-pseudoephedrine ER (WAL-ITIN D 24 HOUR)  MG Oral Tablet 24 Hr 90 tablet 1     Sig: Take 1 tablet by mouth daily.        There is no refill protocol information for this order          Recent Outpatient Visits              1 month ago SK (seborrheic keratosis)    Ann Reina MD    Office Visit    4 months ago Acute URI    Elizabeth Warner, Brittney Jones MD    Office Visit    11 months ago Annual physical exam    Chary Chu MD    Office Visit    1 year ago Screen for colon cancer    [address] [place],[address], [address], [place]    Nurse Only    1 year ago Annual physical exam    Chary Chu MD    Office Visit

## 2023-06-20 NOTE — TELEPHONE ENCOUNTER
Second Prescription Refill Request:  Loratadine-D 24 HR Tablets  Sig: take 1 tablet by mouth every day

## 2023-06-21 RX ORDER — LORATADINE 10 MG
1 TABLET ORAL DAILY
Qty: 90 TABLET | Refills: 1 | Status: SHIPPED
Start: 2023-06-21

## 2023-06-22 NOTE — TELEPHONE ENCOUNTER
Pharmacist is calling about this refill request stating that they have not yet received a refill request. Can it be resent and she also stated that the patient is out of the medication at this time

## 2023-11-01 ENCOUNTER — OFFICE VISIT (OUTPATIENT)
Dept: INTERNAL MEDICINE CLINIC | Facility: CLINIC | Age: 48
End: 2023-11-01
Payer: COMMERCIAL

## 2023-11-01 VITALS
BODY MASS INDEX: 26.51 KG/M2 | TEMPERATURE: 98 F | SYSTOLIC BLOOD PRESSURE: 125 MMHG | HEIGHT: 69 IN | OXYGEN SATURATION: 95 % | WEIGHT: 179 LBS | DIASTOLIC BLOOD PRESSURE: 86 MMHG | HEART RATE: 92 BPM

## 2023-11-01 DIAGNOSIS — E55.9 VITAMIN D DEFICIENCY: ICD-10-CM

## 2023-11-01 DIAGNOSIS — Z00.00 ANNUAL PHYSICAL EXAM: Primary | ICD-10-CM

## 2023-11-01 DIAGNOSIS — Z12.5 SCREENING FOR PROSTATE CANCER: ICD-10-CM

## 2023-11-01 DIAGNOSIS — E53.8 B12 DEFICIENCY: ICD-10-CM

## 2023-11-01 PROBLEM — J30.2 SEASONAL ALLERGIES: Status: RESOLVED | Noted: 2018-10-20 | Resolved: 2023-11-01

## 2023-11-01 PROCEDURE — 3079F DIAST BP 80-89 MM HG: CPT | Performed by: INTERNAL MEDICINE

## 2023-11-01 PROCEDURE — 90472 IMMUNIZATION ADMIN EACH ADD: CPT | Performed by: INTERNAL MEDICINE

## 2023-11-01 PROCEDURE — 3074F SYST BP LT 130 MM HG: CPT | Performed by: INTERNAL MEDICINE

## 2023-11-01 PROCEDURE — 90686 IIV4 VACC NO PRSV 0.5 ML IM: CPT | Performed by: INTERNAL MEDICINE

## 2023-11-01 PROCEDURE — 99396 PREV VISIT EST AGE 40-64: CPT | Performed by: INTERNAL MEDICINE

## 2023-11-01 PROCEDURE — 90471 IMMUNIZATION ADMIN: CPT | Performed by: INTERNAL MEDICINE

## 2023-11-01 PROCEDURE — 3008F BODY MASS INDEX DOCD: CPT | Performed by: INTERNAL MEDICINE

## 2023-11-01 PROCEDURE — 90715 TDAP VACCINE 7 YRS/> IM: CPT | Performed by: INTERNAL MEDICINE

## 2023-11-01 RX ORDER — LORATADINE 10 MG
1 TABLET ORAL DAILY
Qty: 90 TABLET | Refills: 1 | Status: CANCELLED | OUTPATIENT
Start: 2023-11-01

## 2023-11-27 ENCOUNTER — LAB ENCOUNTER (OUTPATIENT)
Dept: LAB | Age: 48
End: 2023-11-27
Attending: INTERNAL MEDICINE
Payer: COMMERCIAL

## 2023-11-27 DIAGNOSIS — Z00.00 ANNUAL PHYSICAL EXAM: ICD-10-CM

## 2023-11-27 DIAGNOSIS — E55.9 VITAMIN D DEFICIENCY: ICD-10-CM

## 2023-11-27 DIAGNOSIS — R73.9 BLOOD GLUCOSE ELEVATED: ICD-10-CM

## 2023-11-27 DIAGNOSIS — E53.8 B12 DEFICIENCY: ICD-10-CM

## 2023-11-27 DIAGNOSIS — Z12.5 SCREENING FOR PROSTATE CANCER: ICD-10-CM

## 2023-11-27 LAB
ALBUMIN SERPL-MCNC: 4.3 G/DL (ref 3.2–4.8)
ALBUMIN/GLOB SERPL: 1.7 {RATIO} (ref 1–2)
ALP LIVER SERPL-CCNC: 53 U/L
ALT SERPL-CCNC: 19 U/L
ANION GAP SERPL CALC-SCNC: 7 MMOL/L (ref 0–18)
AST SERPL-CCNC: 19 U/L (ref ?–34)
BASOPHILS # BLD AUTO: 0.09 X10(3) UL (ref 0–0.2)
BASOPHILS NFR BLD AUTO: 1.1 %
BILIRUB SERPL-MCNC: 0.4 MG/DL (ref 0.3–1.2)
BUN BLD-MCNC: 17 MG/DL (ref 9–23)
BUN/CREAT SERPL: 18.1 (ref 10–20)
CALCIUM BLD-MCNC: 9.2 MG/DL (ref 8.7–10.4)
CHLORIDE SERPL-SCNC: 104 MMOL/L (ref 98–112)
CHOLEST SERPL-MCNC: 209 MG/DL (ref ?–200)
CO2 SERPL-SCNC: 29 MMOL/L (ref 21–32)
COMPLEXED PSA SERPL-MCNC: 0.72 NG/ML (ref ?–4)
CREAT BLD-MCNC: 0.94 MG/DL
DEPRECATED RDW RBC AUTO: 38.7 FL (ref 35.1–46.3)
EGFRCR SERPLBLD CKD-EPI 2021: 99 ML/MIN/1.73M2 (ref 60–?)
EOSINOPHIL # BLD AUTO: 0.34 X10(3) UL (ref 0–0.7)
EOSINOPHIL NFR BLD AUTO: 4 %
ERYTHROCYTE [DISTWIDTH] IN BLOOD BY AUTOMATED COUNT: 12.9 % (ref 11–15)
FASTING PATIENT LIPID ANSWER: YES
FASTING STATUS PATIENT QL REPORTED: YES
GLOBULIN PLAS-MCNC: 2.6 G/DL (ref 2.8–4.4)
GLUCOSE BLD-MCNC: 111 MG/DL (ref 70–99)
HCT VFR BLD AUTO: 47.3 %
HDLC SERPL-MCNC: 47 MG/DL (ref 40–59)
HGB BLD-MCNC: 16 G/DL
IMM GRANULOCYTES # BLD AUTO: 0.02 X10(3) UL (ref 0–1)
IMM GRANULOCYTES NFR BLD: 0.2 %
LDLC SERPL CALC-MCNC: 130 MG/DL (ref ?–100)
LYMPHOCYTES # BLD AUTO: 2.4 X10(3) UL (ref 1–4)
LYMPHOCYTES NFR BLD AUTO: 28.1 %
MCH RBC QN AUTO: 28 PG (ref 26–34)
MCHC RBC AUTO-ENTMCNC: 33.8 G/DL (ref 31–37)
MCV RBC AUTO: 82.7 FL
MONOCYTES # BLD AUTO: 0.55 X10(3) UL (ref 0.1–1)
MONOCYTES NFR BLD AUTO: 6.4 %
NEUTROPHILS # BLD AUTO: 5.14 X10 (3) UL (ref 1.5–7.7)
NEUTROPHILS # BLD AUTO: 5.14 X10(3) UL (ref 1.5–7.7)
NEUTROPHILS NFR BLD AUTO: 60.2 %
NONHDLC SERPL-MCNC: 162 MG/DL (ref ?–130)
OSMOLALITY SERPL CALC.SUM OF ELEC: 292 MOSM/KG (ref 275–295)
PLATELET # BLD AUTO: 302 10(3)UL (ref 150–450)
POTASSIUM SERPL-SCNC: 4.1 MMOL/L (ref 3.5–5.1)
PROT SERPL-MCNC: 6.9 G/DL (ref 5.7–8.2)
RBC # BLD AUTO: 5.72 X10(6)UL
SODIUM SERPL-SCNC: 140 MMOL/L (ref 136–145)
TRIGL SERPL-MCNC: 178 MG/DL (ref 30–149)
TSI SER-ACNC: 1.3 MIU/ML (ref 0.55–4.78)
VIT B12 SERPL-MCNC: 644 PG/ML (ref 211–911)
VIT D+METAB SERPL-MCNC: 19.8 NG/ML (ref 30–100)
VLDLC SERPL CALC-MCNC: 32 MG/DL (ref 0–30)
WBC # BLD AUTO: 8.5 X10(3) UL (ref 4–11)

## 2023-11-27 PROCEDURE — 84443 ASSAY THYROID STIM HORMONE: CPT

## 2023-11-27 PROCEDURE — 82607 VITAMIN B-12: CPT

## 2023-11-27 PROCEDURE — 83036 HEMOGLOBIN GLYCOSYLATED A1C: CPT

## 2023-11-27 PROCEDURE — 82306 VITAMIN D 25 HYDROXY: CPT

## 2023-11-27 PROCEDURE — 85025 COMPLETE CBC W/AUTO DIFF WBC: CPT

## 2023-11-27 PROCEDURE — 80053 COMPREHEN METABOLIC PANEL: CPT

## 2023-11-27 PROCEDURE — 36415 COLL VENOUS BLD VENIPUNCTURE: CPT

## 2023-11-27 PROCEDURE — 80061 LIPID PANEL: CPT

## 2023-11-28 LAB
EST. AVERAGE GLUCOSE BLD GHB EST-MCNC: 111 MG/DL (ref 68–126)
HBA1C MFR BLD: 5.5 % (ref ?–5.7)

## 2024-01-14 ENCOUNTER — APPOINTMENT (OUTPATIENT)
Dept: CT IMAGING | Facility: HOSPITAL | Age: 49
End: 2024-01-14
Attending: STUDENT IN AN ORGANIZED HEALTH CARE EDUCATION/TRAINING PROGRAM
Payer: COMMERCIAL

## 2024-01-14 ENCOUNTER — HOSPITAL ENCOUNTER (EMERGENCY)
Facility: HOSPITAL | Age: 49
Discharge: HOME | End: 2024-01-14
Attending: STUDENT IN AN ORGANIZED HEALTH CARE EDUCATION/TRAINING PROGRAM
Payer: COMMERCIAL

## 2024-01-14 VITALS
SYSTOLIC BLOOD PRESSURE: 168 MMHG | WEIGHT: 178 LBS | HEIGHT: 69 IN | BODY MASS INDEX: 26.36 KG/M2 | RESPIRATION RATE: 16 BRPM | HEART RATE: 65 BPM | DIASTOLIC BLOOD PRESSURE: 113 MMHG | TEMPERATURE: 97 F | OXYGEN SATURATION: 99 %

## 2024-01-14 DIAGNOSIS — R03.0 ELEVATED BLOOD PRESSURE READING WITHOUT DIAGNOSIS OF HYPERTENSION: ICD-10-CM

## 2024-01-14 DIAGNOSIS — N20.1 URETEROLITHIASIS: Primary | ICD-10-CM

## 2024-01-14 LAB
ANION GAP SERPL CALC-SCNC: 3 MMOL/L (ref 0–18)
BASOPHILS # BLD AUTO: 0.05 X10(3) UL (ref 0–0.2)
BASOPHILS NFR BLD AUTO: 0.7 %
BILIRUB UR QL: NEGATIVE
BUN BLD-MCNC: 12 MG/DL (ref 9–23)
BUN/CREAT SERPL: 13 (ref 10–20)
CALCIUM BLD-MCNC: 8.8 MG/DL (ref 8.7–10.4)
CHLORIDE SERPL-SCNC: 107 MMOL/L (ref 98–112)
CO2 SERPL-SCNC: 28 MMOL/L (ref 21–32)
COLOR UR: YELLOW
CREAT BLD-MCNC: 0.92 MG/DL
DEPRECATED RDW RBC AUTO: 38.1 FL (ref 35.1–46.3)
EGFRCR SERPLBLD CKD-EPI 2021: 101 ML/MIN/1.73M2 (ref 60–?)
EOSINOPHIL # BLD AUTO: 0.42 X10(3) UL (ref 0–0.7)
EOSINOPHIL NFR BLD AUTO: 5.6 %
ERYTHROCYTE [DISTWIDTH] IN BLOOD BY AUTOMATED COUNT: 12.8 % (ref 11–15)
GLUCOSE BLD-MCNC: 160 MG/DL (ref 70–99)
GLUCOSE UR-MCNC: NORMAL MG/DL
HCT VFR BLD AUTO: 46.8 %
HGB BLD-MCNC: 15.7 G/DL
IMM GRANULOCYTES # BLD AUTO: 0.02 X10(3) UL (ref 0–1)
IMM GRANULOCYTES NFR BLD: 0.3 %
KETONES UR-MCNC: NEGATIVE MG/DL
LEUKOCYTE ESTERASE UR QL STRIP.AUTO: NEGATIVE
LYMPHOCYTES # BLD AUTO: 2.37 X10(3) UL (ref 1–4)
LYMPHOCYTES NFR BLD AUTO: 31.8 %
MCH RBC QN AUTO: 27.5 PG (ref 26–34)
MCHC RBC AUTO-ENTMCNC: 33.5 G/DL (ref 31–37)
MCV RBC AUTO: 82 FL
MONOCYTES # BLD AUTO: 0.46 X10(3) UL (ref 0.1–1)
MONOCYTES NFR BLD AUTO: 6.2 %
NEUTROPHILS # BLD AUTO: 4.13 X10 (3) UL (ref 1.5–7.7)
NEUTROPHILS # BLD AUTO: 4.13 X10(3) UL (ref 1.5–7.7)
NEUTROPHILS NFR BLD AUTO: 55.4 %
NITRITE UR QL STRIP.AUTO: NEGATIVE
OSMOLALITY SERPL CALC.SUM OF ELEC: 289 MOSM/KG (ref 275–295)
PH UR: 5.5 [PH] (ref 5–8)
PLATELET # BLD AUTO: 290 10(3)UL (ref 150–450)
POTASSIUM SERPL-SCNC: 3.7 MMOL/L (ref 3.5–5.1)
PROT UR-MCNC: 30 MG/DL
RBC # BLD AUTO: 5.71 X10(6)UL
RBC #/AREA URNS AUTO: >10 /HPF
SODIUM SERPL-SCNC: 138 MMOL/L (ref 136–145)
SP GR UR STRIP: 1.02 (ref 1–1.03)
UROBILINOGEN UR STRIP-ACNC: NORMAL
WBC # BLD AUTO: 7.5 X10(3) UL (ref 4–11)
YEAST UR QL: PRESENT /HPF

## 2024-01-14 PROCEDURE — 96374 THER/PROPH/DIAG INJ IV PUSH: CPT

## 2024-01-14 PROCEDURE — 96361 HYDRATE IV INFUSION ADD-ON: CPT

## 2024-01-14 PROCEDURE — 80048 BASIC METABOLIC PNL TOTAL CA: CPT | Performed by: STUDENT IN AN ORGANIZED HEALTH CARE EDUCATION/TRAINING PROGRAM

## 2024-01-14 PROCEDURE — 74176 CT ABD & PELVIS W/O CONTRAST: CPT | Performed by: STUDENT IN AN ORGANIZED HEALTH CARE EDUCATION/TRAINING PROGRAM

## 2024-01-14 PROCEDURE — 96375 TX/PRO/DX INJ NEW DRUG ADDON: CPT

## 2024-01-14 PROCEDURE — 81001 URINALYSIS AUTO W/SCOPE: CPT | Performed by: STUDENT IN AN ORGANIZED HEALTH CARE EDUCATION/TRAINING PROGRAM

## 2024-01-14 PROCEDURE — 99285 EMERGENCY DEPT VISIT HI MDM: CPT

## 2024-01-14 PROCEDURE — 85025 COMPLETE CBC W/AUTO DIFF WBC: CPT | Performed by: STUDENT IN AN ORGANIZED HEALTH CARE EDUCATION/TRAINING PROGRAM

## 2024-01-14 PROCEDURE — 99284 EMERGENCY DEPT VISIT MOD MDM: CPT

## 2024-01-14 RX ORDER — KETOROLAC TROMETHAMINE 15 MG/ML
15 INJECTION, SOLUTION INTRAMUSCULAR; INTRAVENOUS ONCE
Status: COMPLETED | OUTPATIENT
Start: 2024-01-14 | End: 2024-01-14

## 2024-01-14 RX ORDER — ONDANSETRON 2 MG/ML
4 INJECTION INTRAMUSCULAR; INTRAVENOUS ONCE
Status: COMPLETED | OUTPATIENT
Start: 2024-01-14 | End: 2024-01-14

## 2024-01-14 RX ORDER — ONDANSETRON 4 MG/1
4 TABLET, ORALLY DISINTEGRATING ORAL EVERY 4 HOURS PRN
Qty: 10 TABLET | Refills: 0 | Status: SHIPPED | OUTPATIENT
Start: 2024-01-14 | End: 2024-01-21

## 2024-01-14 RX ORDER — TRAMADOL HYDROCHLORIDE 50 MG/1
TABLET ORAL EVERY 6 HOURS PRN
Qty: 10 TABLET | Refills: 0 | Status: SHIPPED | OUTPATIENT
Start: 2024-01-14 | End: 2024-01-19

## 2024-01-14 NOTE — ED PROVIDER NOTES
[place] Emergency Department Note  Patient: Romeo Pendleton Age: 48 year old Sex: male      MRN: [id number]  : 1975    Patient Seen in: [facility] Emergency Department    History     Chief Complaint   Patient presents with    Abdomen/Flank Pain     Stated Complaint: FLANK PAIN    History obtained from: Patient    Patient is a 48-year-old male with past medical history of pericarditis presenting today for evaluation of sudden onset right-sided flank pain starting this morning.  He states that he woke up this morning with intermittent right-sided flank pain associated dry heaves.  Denies any vomiting or fevers.  Denies any dysuria, urinary frequency or urgency.  Endorses the pain is sharp and severe and 10 out of 10 in severity.  Denies any history of similar symptoms.  Denies any history of kidney stones.  Denies any abdominal pain, chest pain or shortness of breath.    Review of Systems:  Review of Systems  Positive for stated complaint: FLANK PAIN. Constitutional and vital signs reviewed. All other systems reviewed and negative except as noted above.    Patient History:  Past Medical History:   Diagnosis Date    Acute pharyngitis     Allergic rhinitis     Cardiomyopathy (HCC)     Chronic sinusitis 2004    Lipid screening 2014    Seasonal allergies     Visual impairment        Past Surgical History:   Procedure Laterality Date    COLONOSCOPY SCREENING - REFERRAL N/A 2022    Procedure: COLONOSCOPY-SCREENING;  Surgeon: Gail Clemente MD;  Location: [facility] ENDOSCOPY    OTHER SURGICAL HISTORY  2004    Chronic sinusitis, sinus surgery     VASECTOMY          Family History   Problem Relation Age of Onset    Cancer Maternal Grandfather     Lung Disorder Paternal Grandfather         Emphysema       Specific Social Determinants of Health:   Social History     Socioeconomic History    Marital status:    Tobacco Use    Smoking status: Some Days     Types: Cigars    Smokeless tobacco: Never     Tobacco comments:     3-6 cigars per year   Vaping Use    Vaping Use: Never used   Substance and Sexual Activity    Alcohol use: Yes     Comment: weekly    Drug use: No   Other Topics Concern    Caffeine Concern Yes     Comment: Weekly; a couple, soda     Pt has a pacemaker No    Pt has a defibrillator No    Reaction to local anesthetic No           PSFH elements reviewed from today and agreed except as otherwise stated in HPI.    Physical Exam     ED Triage Vitals   BP 01/14/24 0914 (!) 172/99   Pulse 01/14/24 0913 66   Resp 01/14/24 0913 22   Temp 01/14/24 0913 96.9 °F (36.1 °C)   Temp src 01/14/24 0913 Temporal   SpO2 01/14/24 0913 99 %   O2 Device 01/14/24 0913 None (Room air)       Current:BP (!) 172/99   Pulse 66   Temp 96.9 °F (36.1 °C) (Temporal)   Resp 22   Ht 175.3 cm (5' 9\")   Wt 80.7 kg   SpO2 99%   BMI 26.29 kg/m²         Physical Exam  Constitutional:       Appearance: He is well-developed.   HENT:      Head: Normocephalic and atraumatic.      Right Ear: External ear normal.      Left Ear: External ear normal.      Nose: Nose normal.   Eyes:      Conjunctiva/sclera: Conjunctivae normal.      Pupils: Pupils are equal, round, and reactive to light.   Cardiovascular:      Rate and Rhythm: Normal rate and regular rhythm.      Heart sounds: Normal heart sounds.   Pulmonary:      Effort: Pulmonary effort is normal.      Breath sounds: Normal breath sounds.   Abdominal:      General: Bowel sounds are normal.      Palpations: Abdomen is soft.      Tenderness: There is no abdominal tenderness. There is no guarding or rebound.      Comments: Right flank tenderness    Musculoskeletal:         General: Normal range of motion.      Cervical back: Normal range of motion and neck supple.   Skin:     General: Skin is warm and dry.      Findings: No rash.   Neurological:      General: No focal deficit present.      Mental Status: He is alert and oriented to person, place, and time.      Deep Tendon Reflexes:  Reflexes are normal and symmetric.   Psychiatric:         Mood and Affect: Mood normal.         Behavior: Behavior normal.         ED Course   Labs:   Labs Reviewed   URINALYSIS WITH CULTURE REFLEX - Abnormal; Notable for the following components:       Result Value    Clarity Urine Turbid (*)     Blood Urine 3+ (*)     Protein Urine 30 (*)     RBC Urine >10 (*)     Bacteria Urine Rare (*)     Yeast Urine Present (*)     All other components within normal limits   BASIC METABOLIC PANEL (8) - Abnormal; Notable for the following components:    Glucose 160 (*)     All other components within normal limits   CBC W/ DIFFERENTIAL - Abnormal; Notable for the following components:    RBC 5.71 (*)     All other components within normal limits   CBC WITH DIFFERENTIAL WITH PLATELET    Narrative:     The following orders were created for panel order CBC With Differential With Platelet.  Procedure                               Abnormality         Status                     ---------                               -----------         ------                     CBC W/ DIFFERENTIAL[[id number]]          Abnormal            Final result                 Please view results for these tests on the individual orders.   RAINBOW DRAW LAVENDER   RAINBOW DRAW LIGHT GREEN   RAINBOW DRAW BLUE   RAINBOW DRAW GOLD     Radiology findings:  I personally reviewed the images.   CT ABDOMEN+PELVIS KIDNEYSTONE 2D RNDR(NO IV,NO ORAL)(CPT=74176)    Result Date: 1/14/2024  CONCLUSION:  1.  Moderate right hydroureteronephrosis related to an obstructing 4 x 6 mm stone in the distal right ureter located approximately 2 cm from the UVJ.  No additional calculi in the kidneys, ureters, or bladder. 2.  Prostate enlargement. 3.  Small fat containing inguinal hernias.    Dictated by (CST): Jose Bernal MD on 1/14/2024 at 10:19 AM     Finalized by (CST): Jose Bernal MD on 1/14/2024 at 10:23 AM             Cardiac Monitor: Interpreted by me.   Pulse Readings from  Last 1 Encounters:   01/14/24 66   , sinus,     External non-ED records reviewed independently by me: Echocardiogram from 6/19/2012 with ejection fraction 50 to 55%    MDM   48-year-old male with past medical history of pericarditis presenting today for evaluation of sudden onset right-sided flank pain starting this morning.  Upon arrival to the emergency department, he is well-appearing and in no acute distress.  Hypertensive otherwise vitals are within normal limits.  He has reproducible right-sided flank tenderness with no rebound or guarding.    Differential diagnoses considered includes, but is not limited to: Nephrolithiasis, UTI/pyelonephritis, muscle strain, intra-abdominal infection, acute appendicitis    Will obtain the following tests: CBC, CMP, urinalysis, CT abdomen pelvis with kidney stone protocol  Please see ED course for my independent review of these tests/imaging results.    Initial Medications/Therapeutics administered: IV fluids, Toradol, Zofran    Chronic conditions affecting care: Pericarditis    Workup and medications considered but not ordered: None    Social Determinants of Health that impacted care: None    ED Course as of 01/14/24 1100  ------------------------------------------------------------  Time: 01/14 1058  Comment: Labs reassuring.  Urinalysis with evidence of hematuria.  Normal renal function.  Independently reviewed the CT abdomen pelvis images that show evidence of a right distal ureteral stone approximately 2 cm from the UVJ with moderate right-sided hydro ureteronephrosis.  Agree with radiology read above.  Upon reevaluation, noted to have significant preoperative symptoms.  Remains mildly hypertensive with no history of hypertension.  Recommended PCP follow-up in 1 week for repeat blood pressure check.  Patient otherwise stable for discharge home at this time with continued analgesic support and expectant management of kidney stone.  Return precautions including fevers,  worsening pain, dysuria, urinary frequency or urgency were discussed and all questions were answered.  Patient expressed understanding and agreement with this plan.        Disposition and Plan     Clinical Impression:  1. Ureterolithiasis    2. Elevated blood pressure reading without diagnosis of hypertension        Disposition:  Discharge    Follow-up:  Lana Mcadams MD  [address] [id number]  [phone]    Schedule an appointment as soon as possible for a visit in 1 week(s)  If symptoms worsen, As needed; for repeat BP check    Vicente Han MD  [address] [address]  [facility] [address]  [phone]    Schedule an appointment as soon as possible for a visit in 2 day(s)  As needed, If symptoms worsen      Medications Prescribed:  Current Discharge Medication List        START taking these medications    Details   traMADol 50 MG Oral Tab Take 1-2 tablets ( mg total) by mouth every 6 (six) hours as needed for Pain.  Qty: 10 tablet, Refills: 0    Associated Diagnoses: Ureterolithiasis      ondansetron 4 MG Oral Tablet Dispersible Take 1 tablet (4 mg total) by mouth every 4 (four) hours as needed for Nausea.  Qty: 10 tablet, Refills: 0               This note may have been created using voice dictation technology and may include inadvertent errors.      Yessy Wolf MD  Emergency Medicine

## 2024-01-14 NOTE — DISCHARGE INSTRUCTIONS
Thank you for seeking care at [facility] Emergency Department.  You have been seen, evaluated, and diagnosed with kidney stones.    We reviewed the results from your visit in the emergency department.    Please read the instructions provided   If provided, take prescriptions as instructed.     Please follow up with the urologist in 3-5 days. Call today or tomorrow to schedule an appointment. Ensure that you stay well hydrated while you attempt to pass this stone. You should strain your urine with a urine strainer to monitor for passage of the kidney stone.    Remember, your care process does not end after your visit today. Please follow-up with your doctor within 1-2 days for a follow-up check to ensure you are  improving, to see if you need any further evaluation/testing, or to evaluate for any alternate diagnoses.     Please return to the emergency department if your symptoms are persisting for more than 48 hours, getting worse, or you begin having fevers or chills, or if you develop any other new or concerning symptoms as these could be signs of more serious medical illness. Fevers with a kidney stone always requires prompt reevaluation.    We hope you feel better.

## 2024-01-14 NOTE — ED QUICK NOTES
Discharge vitals obtained.  Bp checked on both arms after hypertensive reading. Both arms with elevated readings.   ERMD aware of bp (168/113), still ok for discharge, pt to follow up with pcp for recheck on bp.  Pt verbalized understanding.  All questions answered, follow up reviewed.  Pt ambulated out of ER with wife

## 2024-01-15 ENCOUNTER — TELEPHONE (OUTPATIENT)
Dept: SURGERY | Facility: CLINIC | Age: 49
End: 2024-01-15

## 2024-01-15 ENCOUNTER — OFFICE VISIT (OUTPATIENT)
Dept: SURGERY | Facility: CLINIC | Age: 49
End: 2024-01-15
Payer: COMMERCIAL

## 2024-01-15 VITALS — SYSTOLIC BLOOD PRESSURE: 168 MMHG | DIASTOLIC BLOOD PRESSURE: 100 MMHG

## 2024-01-15 DIAGNOSIS — N20.1 RIGHT URETERAL STONE: ICD-10-CM

## 2024-01-15 DIAGNOSIS — N20.1 RIGHT URETERAL STONE: Primary | ICD-10-CM

## 2024-01-15 DIAGNOSIS — N23 RENAL COLIC ON RIGHT SIDE: ICD-10-CM

## 2024-01-15 DIAGNOSIS — N13.2 URETERAL STONE WITH HYDRONEPHROSIS: ICD-10-CM

## 2024-01-15 PROCEDURE — 87086 URINE CULTURE/COLONY COUNT: CPT | Performed by: UROLOGY

## 2024-01-15 RX ORDER — TAMSULOSIN HYDROCHLORIDE 0.4 MG/1
0.4 CAPSULE ORAL
Qty: 30 CAPSULE | Refills: 0 | Status: SHIPPED | OUTPATIENT
Start: 2024-01-15

## 2024-01-15 RX ORDER — TAMSULOSIN HYDROCHLORIDE 0.4 MG/1
CAPSULE ORAL
Qty: 90 CAPSULE | Refills: 0 | OUTPATIENT
Start: 2024-01-15

## 2024-01-15 NOTE — H&P
[facility]  Initial Office Consultation    HPI:   Romeo Pendleton is a 48 year old male here today for consultation at the request of, and a copy of this note will be sent to, YAIMA VARGAS MD. accompanied by his wife.    1.  Right ureteral stone  Patient denies previous history of nephrolithiasis.    He reports sudden onset colicky right flank pain starting 1/14/2024.  Associated with nausea and dry heaving.  Pain got severe and he presented to the ER on 1/14/2024.    CT revealed a 6 x 4 mm obstructing right distal ureteral stone with upstream hydronephrosis.  No additional urolithiasis noted.    UA with microhematuria but no signs of UTI.  WBC count within normal.    His pain was controlled and he was discharged home with prescriptions for pain medicine and antinausea medication.    He has been straining his urine.  He has not passed the stone.  He still has intermittent right loin pain.  He denies fevers or chills.  No gross hematuria or dysuria.      PAST MEDICAL HISTORY: GERD.    PAST SURGICAL HISTORY: Sinus surgery.  Vasectomy.    SOCIAL HISTORY: .  Has 2 sons.  Occasional cigar smoking.  No cigarette smoking.  Social alcohol.  Works as a .     Family History   Problem Relation Age of Onset    Cancer Maternal Grandfather     Lung Disorder Paternal Grandfather         Emphysema     Allergies: Mildew, Mold, and Pollen      REVIEW OF SYSTEMS:  Pertinent positives and negatives per HPI. A 12-point ROS was performed and is otherwise negative.       EXAM:  BP (!) 168/100 (BP Location: Right arm, Patient Position: Sitting, Cuff Size: adult)     Physical Exam  Constitutional:       Appearance: He is well-developed.   HENT:      Head: Normocephalic.   Eyes:      General: No scleral icterus.  Cardiovascular:      Rate and Rhythm: Normal rate.   Pulmonary:      Effort: Pulmonary effort is normal.   Abdominal:      General: There is no distension.      Palpations:  Abdomen is soft.      Tenderness: There is no abdominal tenderness. There is no right CVA tenderness or left CVA tenderness.   Genitourinary:     Penis: Circumcised.    Skin:     General: Skin is warm and dry.   Neurological:      Mental Status: He is alert and oriented to person, place, and time.   Psychiatric:         Mood and Affect: Mood normal.         Behavior: Behavior normal.       LABS:  See HPI for details.      IMAGING:    CT ABDOMEN+PELVIS allWITHOUT CONTRAST, which I personally reviewed (2024).  Final radiology report revealin.  Moderate right hydroureteronephrosis related to an obstructing 4 x 6 mm stone in the distal right ureter located approximately 2 cm from the UVJ.  No additional calculi in the kidneys, ureters, or bladder. 2.  Prostate enlargement. 3.  Small fat containing inguinal hernias.          IMPRESSION:  48 year old male with a symptomatic, obstructing 4 x 6 mm right distal ureteral stone.  No previous history of nephrolithiasis.    Findings reviewed with patient and wife at length.  We discussed relevant anatomy and physiology.    Different stone management options were discussed including medical expulsive therapy, ESWL with or without pre-stenting, ureteroscopy with laser lithotripsy.     Rationale and approach as well as benefits, risks, possible complications and reasonable alternatives of each treatment were discussed with the patient. Stone clearance rates were discussed as well as the expected postoperative course of recovery.     We discussed the eventual need for stent removal which is usually performed in the office setting.    We discussed risks of surgery including but not limited to medical and anesthetic complications, bleeding, infection, damage to surrounding organs or structures, ureteral injury, stricture formation, and need for additional procedures or a staged approach.     Patient verbalized understanding. All questions were answered.    He wishes to  continue trial of medical expulsive therapy.    Signs and symptoms requiring presentation to the ER were discussed with the patient.      PLAN:  1. Continue trial of medical expulsive therapy.    2. Tamsulosin 0.4 mg daily until stone passes.    3. Send urine for culture in case surgical intervention is indicated.    Patient will let us know if and when he passes a stone or decides to proceed with surgical management of his right ureteral stone.    Vicente Han MD  1/15/2024

## 2024-01-15 NOTE — TELEPHONE ENCOUNTER
-[facility] ordered pt called as f/u to 24 ER visit for Right Flank pain/ stones.  -S/w pt; identity verified with name & .    -I explained to pt that [facility] wanted to see his today as ER f/u. He replied, \" Oh no I'm not having surgery; I'm passing this stone on my own.\"  -His voice was hoarse, & asked if he could call back when he could speak clearly; explained he needed to contact [facility] [phone].  -[facility] made aware of pt response.  -Outcome pending.

## 2024-01-15 NOTE — H&P (VIEW-ONLY)
[facility]  Initial Office Consultation    HPI:   Romeo Pendleton is a 48 year old male here today for consultation at the request of, and a copy of this note will be sent to, YAIMA VARGAS MD. accompanied by his wife.    1.  Right ureteral stone  Patient denies previous history of nephrolithiasis.    He reports sudden onset colicky right flank pain starting 1/14/2024.  Associated with nausea and dry heaving.  Pain got severe and he presented to the ER on 1/14/2024.    CT revealed a 6 x 4 mm obstructing right distal ureteral stone with upstream hydronephrosis.  No additional urolithiasis noted.    UA with microhematuria but no signs of UTI.  WBC count within normal.    His pain was controlled and he was discharged home with prescriptions for pain medicine and antinausea medication.    He has been straining his urine.  He has not passed the stone.  He still has intermittent right loin pain.  He denies fevers or chills.  No gross hematuria or dysuria.      PAST MEDICAL HISTORY: GERD.    PAST SURGICAL HISTORY: Sinus surgery.  Vasectomy.    SOCIAL HISTORY: .  Has 2 sons.  Occasional cigar smoking.  No cigarette smoking.  Social alcohol.  Works as a .     Family History   Problem Relation Age of Onset    Cancer Maternal Grandfather     Lung Disorder Paternal Grandfather         Emphysema     Allergies: Mildew, Mold, and Pollen      REVIEW OF SYSTEMS:  Pertinent positives and negatives per HPI. A 12-point ROS was performed and is otherwise negative.       EXAM:  BP (!) 168/100 (BP Location: Right arm, Patient Position: Sitting, Cuff Size: adult)     Physical Exam  Constitutional:       Appearance: He is well-developed.   HENT:      Head: Normocephalic.   Eyes:      General: No scleral icterus.  Cardiovascular:      Rate and Rhythm: Normal rate.   Pulmonary:      Effort: Pulmonary effort is normal.   Abdominal:      General: There is no distension.      Palpations:  Abdomen is soft.      Tenderness: There is no abdominal tenderness. There is no right CVA tenderness or left CVA tenderness.   Genitourinary:     Penis: Circumcised.    Skin:     General: Skin is warm and dry.   Neurological:      Mental Status: He is alert and oriented to person, place, and time.   Psychiatric:         Mood and Affect: Mood normal.         Behavior: Behavior normal.       LABS:  See HPI for details.      IMAGING:    CT ABDOMEN+PELVIS allWITHOUT CONTRAST, which I personally reviewed (2024).  Final radiology report revealin.  Moderate right hydroureteronephrosis related to an obstructing 4 x 6 mm stone in the distal right ureter located approximately 2 cm from the UVJ.  No additional calculi in the kidneys, ureters, or bladder. 2.  Prostate enlargement. 3.  Small fat containing inguinal hernias.          IMPRESSION:  48 year old male with a symptomatic, obstructing 4 x 6 mm right distal ureteral stone.  No previous history of nephrolithiasis.    Findings reviewed with patient and wife at length.  We discussed relevant anatomy and physiology.    Different stone management options were discussed including medical expulsive therapy, ESWL with or without pre-stenting, ureteroscopy with laser lithotripsy.     Rationale and approach as well as benefits, risks, possible complications and reasonable alternatives of each treatment were discussed with the patient. Stone clearance rates were discussed as well as the expected postoperative course of recovery.     We discussed the eventual need for stent removal which is usually performed in the office setting.    We discussed risks of surgery including but not limited to medical and anesthetic complications, bleeding, infection, damage to surrounding organs or structures, ureteral injury, stricture formation, and need for additional procedures or a staged approach.     Patient verbalized understanding. All questions were answered.    He wishes to  continue trial of medical expulsive therapy.    Signs and symptoms requiring presentation to the ER were discussed with the patient.      PLAN:  1. Continue trial of medical expulsive therapy.    2. Tamsulosin 0.4 mg daily until stone passes.    3. Send urine for culture in case surgical intervention is indicated.    Patient will let us know if and when he passes a stone or decides to proceed with surgical management of his right ureteral stone.    Vicente Han MD  1/15/2024

## 2024-01-16 ENCOUNTER — TELEPHONE (OUTPATIENT)
Dept: SURGERY | Facility: CLINIC | Age: 49
End: 2024-01-16

## 2024-01-16 NOTE — TELEPHONE ENCOUNTER
Called patient, verified name and .  Patient states he has pain that comes and goes and he only used 5 tablets of the tramadol he had prescribed from ER.   Advised that ibuprofen is better for the kidney pain. He denies fever and blood. Patient says he knows when to go to ER. He is also asking what he can use for his sore throat which he says he has from vomiting. I let him know to make sure he drinks enough fluids, tea,warm soup, and a cough drops to keep his throat moist. He says he is not vomiting currently. Pt verbalized understandings and has no further questions.

## 2024-01-17 ENCOUNTER — OFFICE VISIT (OUTPATIENT)
Dept: INTERNAL MEDICINE CLINIC | Facility: CLINIC | Age: 49
End: 2024-01-17

## 2024-01-17 ENCOUNTER — TELEPHONE (OUTPATIENT)
Dept: SURGERY | Facility: CLINIC | Age: 49
End: 2024-01-17

## 2024-01-17 VITALS
TEMPERATURE: 98 F | BODY MASS INDEX: 26.66 KG/M2 | OXYGEN SATURATION: 100 % | DIASTOLIC BLOOD PRESSURE: 81 MMHG | WEIGHT: 180 LBS | HEIGHT: 69 IN | HEART RATE: 97 BPM | SYSTOLIC BLOOD PRESSURE: 130 MMHG

## 2024-01-17 DIAGNOSIS — T78.40XD ALLERGY, SUBSEQUENT ENCOUNTER: ICD-10-CM

## 2024-01-17 DIAGNOSIS — Z51.81 MONITORING FOR ANTICOAGULANT USE: ICD-10-CM

## 2024-01-17 DIAGNOSIS — N20.1 RIGHT URETERAL STONE: Primary | ICD-10-CM

## 2024-01-17 DIAGNOSIS — Z01.818 PREOP EXAMINATION: ICD-10-CM

## 2024-01-17 DIAGNOSIS — N13.2 URETERAL STONE WITH HYDRONEPHROSIS: ICD-10-CM

## 2024-01-17 DIAGNOSIS — Z79.01 MONITORING FOR ANTICOAGULANT USE: ICD-10-CM

## 2024-01-17 PROCEDURE — 3079F DIAST BP 80-89 MM HG: CPT | Performed by: INTERNAL MEDICINE

## 2024-01-17 PROCEDURE — 99213 OFFICE O/P EST LOW 20 MIN: CPT | Performed by: INTERNAL MEDICINE

## 2024-01-17 PROCEDURE — 3008F BODY MASS INDEX DOCD: CPT | Performed by: INTERNAL MEDICINE

## 2024-01-17 PROCEDURE — 3075F SYST BP GE 130 - 139MM HG: CPT | Performed by: INTERNAL MEDICINE

## 2024-01-17 RX ORDER — LORATADINE 10 MG
1 TABLET ORAL DAILY
Qty: 90 TABLET | Refills: 1 | Status: SHIPPED | OUTPATIENT
Start: 2024-01-17

## 2024-01-17 NOTE — TELEPHONE ENCOUNTER
Called patient.  Having ongoing pain that is intermittent.  Has not passed the stone yet.  He would like to tentatively schedule surgery for early next week in case he does not pass the stone.    Urology Surgery Scheduling Request    Location: [facility] OR    Surgeon: MARCELINO Han MD    Asst. Surgeon: N/A    Diagnosis: Right ureteral stone.  Ureteral stone with hydronephrosis.    Procedure: Cystoscopy, right retrograde pyelography, right ureteroscopy, laser lithotripsy, stone removal, stent insertion.    Procedure CPT Code (if known): 61120, 42601.    Anesthesia: General     Time Frame: 1/23/2024.    Time needed:  75 minutes.     Special Equipment: Holmium Laser and fluoroscopy C arm.    On Call to OR: Ceftriaxone (Rocephin) 1 g IV and gentamicin 240 mg IV.    Admission: Day Surgery    Pre-op Testing: PT/INR, PTT, and EKG     Need Pre-op Clearance:  N/A    Estimated Post Op/Follow Up Appt:  TBD for stent removal .    Vicente Han MD  1/17/2024

## 2024-01-17 NOTE — TELEPHONE ENCOUNTER
Felipe Webster, called office wants to proceed with surgery, please provide surgery request via paper/epic

## 2024-01-17 NOTE — TELEPHONE ENCOUNTER
Patient returned call, scheduled Cystoscopy, right retrograde pyelography, right ureteroscopy, laser lithotripsy, stone removal, stent insertion. Tuesday 01/23/2024, [facility], went over pre-op/lab instructions, all sent to patient' my chart.

## 2024-01-17 NOTE — PROGRESS NOTES
Subjective:     Patient ID: Romeo Pendleton is a 48 year old male.    HPI    History/Other: Came in today for follow-up from emergency room.  He went to ER because of right flank pain was diagnosed with a kidney stone and right hydronephrosis he was seen by urologist but he refused to get any procedure done he still has on and off pain he did not pass the stone he is straining the urine there is no blood in the urine.  He did discuss with urology today plan is for cystoscopy on Tuesday.  He states that he just started to take tamsulosin.  He drinks a lot of fluids.  Review of Systems   Constitutional: Negative.    HENT: Negative.     Eyes: Negative.    Respiratory: Negative.     Cardiovascular: Negative.    Gastrointestinal: Negative.    Genitourinary:  Positive for flank pain.   Neurological: Negative.    Hematological: Negative.    Psychiatric/Behavioral: Negative.       Current Outpatient Medications   Medication Sig Dispense Refill    loratadine-pseudoephedrine ER (WAL-ITIN D 24 HOUR)  MG Oral Tablet 24 Hr Take 1 tablet by mouth daily. 90 tablet 1    tamsulosin 0.4 MG Oral Cap Take 1 capsule (0.4 mg total) by mouth daily with dinner. Take 1/2 hour following the same meal each day. Stop if you pass the stone. 30 capsule 0    Omeprazole 40 MG Oral Capsule Delayed Release TAKE 1 CAPSULE BY MOUTH ONCE DAILY BEFORE A MEAL 90 capsule 3    traMADol 50 MG Oral Tab Take 1-2 tablets ( mg total) by mouth every 6 (six) hours as needed for Pain. (Patient not taking: Reported on 1/17/2024) 10 tablet 0    ondansetron 4 MG Oral Tablet Dispersible Take 1 tablet (4 mg total) by mouth every 4 (four) hours as needed for Nausea. (Patient not taking: Reported on 1/17/2024) 10 tablet 0     Allergies:  Allergies   Allergen Reactions    Mildew ITCHING    Mold ITCHING    Pollen ITCHING       Past Medical History:   Diagnosis Date    Acute pharyngitis     Allergic rhinitis     Cardiomyopathy (HCC)     Chronic sinusitis 2004     Lipid screening 01/28/2014    Seasonal allergies     Visual impairment       Past Surgical History:   Procedure Laterality Date    COLONOSCOPY SCREENING - REFERRAL N/A 8/24/2022    Procedure: COLONOSCOPY-SCREENING;  Surgeon: Gail Clemente MD;  [facility] ENDOSCOPY    OTHER SURGICAL HISTORY  2004    Chronic sinusitis, sinus surgery     VASECTOMY  2011      Family History   Problem Relation Age of Onset    Cancer Maternal Grandfather     Lung Disorder Paternal Grandfather         Emphysema      Social History:   Social History     Socioeconomic History    Marital status:    Tobacco Use    Smoking status: Some Days     Types: Cigars    Smokeless tobacco: Never    Tobacco comments:     3-6 cigars per year   Vaping Use    Vaping Use: Never used   Substance and Sexual Activity    Alcohol use: Yes     Comment: weekly    Drug use: No   Other Topics Concern    Caffeine Concern Yes     Comment: Weekly; a couple, soda     Pt has a pacemaker No    Pt has a defibrillator No    Reaction to local anesthetic No        Objective:   Physical Exam  Constitutional:       Appearance: Normal appearance.   HENT:      Head: Normocephalic and atraumatic.   Cardiovascular:      Rate and Rhythm: Normal rate and regular rhythm.      Pulses: Normal pulses.      Heart sounds: Normal heart sounds.   Pulmonary:      Effort: Pulmonary effort is normal.      Breath sounds: Normal breath sounds.   Abdominal:      Palpations: Abdomen is soft.   Musculoskeletal:         General: Normal range of motion.      Cervical back: Normal range of motion and neck supple.   Skin:     General: Skin is warm.   Neurological:      Mental Status: He is alert. Mental status is at baseline.   Psychiatric:         Mood and Affect: Mood normal.         Assessment & Plan:   1. Right ureteral stone ER note reviewed, urology note reviewed, discussed with patient as well advised him to drink plenty of fluids continue with tamsulosin, follow-up with urology as  scheduled   2. Allergy, subsequent encounter        No orders of the defined types were placed in this encounter.      Meds This Visit:  Requested Prescriptions     Signed Prescriptions Disp Refills    loratadine-pseudoephedrine ER (WAL-ITIN D 24 HOUR)  MG Oral Tablet 24 Hr 90 tablet 1     Sig: Take 1 tablet by mouth daily.       Imaging & Referrals:  None

## 2024-01-18 ENCOUNTER — LAB ENCOUNTER (OUTPATIENT)
Dept: LAB | Facility: HOSPITAL | Age: 49
End: 2024-01-18
Attending: UROLOGY
Payer: COMMERCIAL

## 2024-01-18 DIAGNOSIS — Z79.01 MONITORING FOR ANTICOAGULANT USE: ICD-10-CM

## 2024-01-18 DIAGNOSIS — Z51.81 MONITORING FOR ANTICOAGULANT USE: ICD-10-CM

## 2024-01-18 DIAGNOSIS — Z01.818 PREOP EXAMINATION: ICD-10-CM

## 2024-01-18 LAB
APTT PPP: 31.2 SECONDS (ref 23.3–35.6)
ATRIAL RATE: 94 BPM
INR BLD: 1.03 (ref 0.8–1.2)
P AXIS: 11 DEGREES
P-R INTERVAL: 150 MS
PROTHROMBIN TIME: 14.2 SECONDS (ref 11.6–14.8)
Q-T INTERVAL: 336 MS
QRS DURATION: 80 MS
QTC CALCULATION (BEZET): 420 MS
R AXIS: 27 DEGREES
T AXIS: 48 DEGREES
VENTRICULAR RATE: 94 BPM

## 2024-01-18 PROCEDURE — 85730 THROMBOPLASTIN TIME PARTIAL: CPT

## 2024-01-18 PROCEDURE — 93010 ELECTROCARDIOGRAM REPORT: CPT | Performed by: INTERNAL MEDICINE

## 2024-01-18 PROCEDURE — 36415 COLL VENOUS BLD VENIPUNCTURE: CPT

## 2024-01-18 PROCEDURE — 93005 ELECTROCARDIOGRAM TRACING: CPT

## 2024-01-18 PROCEDURE — 85610 PROTHROMBIN TIME: CPT

## 2024-01-18 RX ORDER — SODIUM CHLORIDE, SODIUM LACTATE, POTASSIUM CHLORIDE, CALCIUM CHLORIDE 600; 310; 30; 20 MG/100ML; MG/100ML; MG/100ML; MG/100ML
INJECTION, SOLUTION INTRAVENOUS CONTINUOUS
Status: CANCELLED | OUTPATIENT
Start: 2024-01-18

## 2024-01-22 NOTE — DISCHARGE INSTRUCTIONS
HOME INSTRUCTIONS  - Blood in the urine, burning with urination, and the urgency to urinate are normal and expected for 5-7 days.  - Make sure you drink enough water to keep the urine light pink to clear.  - Take the prescribed antibiotics as instructed for 5 days.  - Take the Pyridium (also called Azo and available over-the-counter) for burning with urination. This medication will make your urine orange in color.  - Dr. Han's office will contact you within the next few days to schedule you for office cystoscopy and stent removal in about 2 weeks. If you do not receive a call within 5 business days, please call to schedule the appointment.   - Call or go to the ER for intractable pain, fevers >101 F, shaking chills, nausea and vomiting, chest pain or shortness of breath.  We wish you a safe, speedy, and uneventful recovery.     AMBSURG HOME CARE INSTRUCTIONS: POST-OP ANESTHESIA  The medication that you received for sedation or general anesthesia can last up to 24 hours. Your judgment and reflexes may be altered, even if you feel like your normal self.      We Recommend:   Do not drive any motor vehicle or bicycle   Avoid mowing the lawn, playing sports, or working with power tools/applicances (power saws, electric knives or mixers)   That you have someone stay with you on your first night home   Do not drink alcohol or take sleeping pills or tranquilizers   Do not sign legal documents within 24 hours of your procedure   If you had a nerve block for your surgery, take extra care not to put any pressure on your arm or hand for 24 hours    It is normal:  For you to have a sore throat if you had a breathing tube during surgery (while you were asleep!). The sore throat should get better within 48 hours. You can gargle with warm salt water (1/2 tsp in 4 oz warm water) or use a throat lozenge for comfort  To feel muscle aches or soreness especially in the abdomen, chest or neck. The achy feeling should go away in the  next 24 hours  To feel weak, sleepy or \"wiped out\". Your should start feeling better in the next 24 hours.   To experience mild discomforts such as sore lip or tongue, headache, cramps, gas pains or a bloated feeling in your abdomen.   To experience mild back pain or soreness for a day or two if you had spinal or epidural anesthesia.   If you had laparoscopic surgery, to feel shoulder pain or discomfort on the day of surgery.   For some patients to have nausea after surgery/anesthesia    If you feel nausea or experience vomiting:   Try to move around less.   Eat less than usual or drink only liquids until the next morning   Nausea should resolve in about 24 hours    If you have a problem when you are at home:    Call your surgeons office   Discharge Instructions: After Your Surgery  You’ve just had surgery. During surgery, you were given medicine called anesthesia to keep you relaxed and free of pain. After surgery, you may have some pain or nausea. This is common. Here are some tips for feeling better and getting well after surgery.   Going home  Your healthcare provider will show you how to take care of yourself when you go home. They'll also answer your questions. Have an adult family member or friend drive you home. For the first 24 hours after your surgery:   Don't drive or use heavy equipment.  Don't make important decisions or sign legal papers.  Take medicines as directed.  Don't drink alcohol.  Have someone stay with you, if needed. They can watch for problems and help keep you safe.  Be sure to go to all follow-up visits with your healthcare provider. And rest after your surgery for as long as your provider tells you to.   Coping with pain  If you have pain after surgery, pain medicine will help you feel better. Take it as directed, before pain becomes severe. Also, ask your healthcare provider or pharmacist about other ways to control pain. This might be with heat, ice, or relaxation. And follow any other  instructions your surgeon or nurse gives you.      Stay on schedule with your medicine.     Tips for taking pain medicine  To get the best relief possible, remember these points:   Pain medicines can upset your stomach. Taking them with a little food may help.  Most pain relievers taken by mouth need at least 20 to 30 minutes to start to work.  Don't wait till your pain becomes severe before you take your medicine. Try to time your medicine so that you can take it before starting an activity. This might be before you get dressed, go for a walk, or sit down for dinner.  Constipation is a common side effect of some pain medicines. Call your healthcare provider before taking any medicines such as laxatives or stool softeners to help ease constipation. Also ask if you should skip any foods. Drinking lots of fluids and eating foods such as fruits and vegetables that are high in fiber can also help. Remember, don't take laxatives unless your surgeon has prescribed them.  Drinking alcohol and taking pain medicine can cause dizziness and slow your breathing. It can even be deadly. Don't drink alcohol while taking pain medicine.  Pain medicine can make you react more slowly to things. Don't drive or run machinery while taking pain medicine.  Your healthcare provider may tell you to take acetaminophen to help ease your pain. Ask them how much you're supposed to take each day. Acetaminophen or other pain relievers may interact with your prescription medicines or other over-the-counter (OTC) medicines. Some prescription medicines have acetaminophen and other ingredients in them. Using both prescription and OTC acetaminophen for pain can cause you to accidentally overdose. Read the labels on your OTC medicines with care. This will help you to clearly know the list of ingredients, how much to take, and any warnings. It may also help you not take too much acetaminophen. If you have questions or don't understand the information,  ask your pharmacist or healthcare provider to explain it to you before you take the OTC medicine.   Managing nausea  Some people have an upset stomach (nausea) after surgery. This is often because of anesthesia, pain, or pain medicine, less movement of food in the stomach, or the stress of surgery. These tips will help you handle nausea and eat healthy foods as you get better. If you were on a special food plan before surgery, ask your healthcare provider if you should follow it while you get better. Check with your provider on how your eating should progress. It may depend on the surgery you had. These general tips may help:   Don't push yourself to eat. Your body will tell you when to eat and how much.  Start off with clear liquids and soup. They're easier to digest.  Next try semi-solid foods as you feel ready. These include mashed potatoes, applesauce, and gelatin.  Slowly move to solid foods. Don’t eat fatty, rich, or spicy foods at first.  Don't force yourself to have 3 large meals a day. Instead eat smaller amounts more often.  Take pain medicines with a small amount of solid food, such as crackers or toast. This helps prevent nausea.  When to call your healthcare provider  Call your healthcare provider right away if you have any of these:   You still have too much pain, or the pain gets worse, after taking the medicine. The medicine may not be strong enough. Or there may be a complication from the surgery.  You feel too sleepy, dizzy, or groggy. The medicine may be too strong.  Side effects such as nausea or vomiting. Your healthcare provider may advise taking other medicines to .  Skin changes such as rash, itching, or hives. This may mean you have an allergic reaction. Your provider may advise taking other medicines.  The incision looks different (for instance, part of it opens up).  Bleeding or fluid leaking from the incision site, and weren't told to expect that.  Fever of 100.4°F (38°C) or higher, or as  directed by your provider.  Call 911  Call 911 right away if you have:   Trouble breathing  Facial swelling    If you have obstructive sleep apnea   You were given anesthesia medicine during surgery to keep you comfortable and free of pain. After surgery, you may have more apnea spells because of this medicine and other medicines you were given. The spells may last longer than normal.    At home:  Keep using the continuous positive airway pressure (CPAP) device when you sleep. Unless your healthcare provider tells you not to, use it when you sleep, day or night. CPAP is a common device used to treat obstructive sleep apnea.  Talk with your provider before taking any pain medicine, muscle relaxants, or sedatives. Your provider will tell you about the possible dangers of taking these medicines.  Contact your provider if your sleeping changes a lot even when taking medicines as directed.  [employer] last reviewed this educational content on 10/1/2021  © 6931-7610 [employer]. All rights reserved. This information is not intended as a substitute for professional medical care. Always follow your healthcare professional's instructions.

## 2024-01-23 ENCOUNTER — APPOINTMENT (OUTPATIENT)
Dept: GENERAL RADIOLOGY | Facility: HOSPITAL | Age: 49
End: 2024-01-23
Attending: UROLOGY
Payer: COMMERCIAL

## 2024-01-23 ENCOUNTER — TELEPHONE (OUTPATIENT)
Dept: SURGERY | Facility: CLINIC | Age: 49
End: 2024-01-23

## 2024-01-23 ENCOUNTER — ANESTHESIA (OUTPATIENT)
Dept: SURGERY | Facility: HOSPITAL | Age: 49
End: 2024-01-23
Payer: COMMERCIAL

## 2024-01-23 ENCOUNTER — HOSPITAL ENCOUNTER (OUTPATIENT)
Facility: HOSPITAL | Age: 49
Setting detail: HOSPITAL OUTPATIENT SURGERY
Discharge: HOME | End: 2024-01-23
Attending: UROLOGY | Admitting: UROLOGY
Payer: COMMERCIAL

## 2024-01-23 ENCOUNTER — ANESTHESIA EVENT (OUTPATIENT)
Dept: SURGERY | Facility: HOSPITAL | Age: 49
End: 2024-01-23
Payer: COMMERCIAL

## 2024-01-23 VITALS
RESPIRATION RATE: 14 BRPM | HEIGHT: 69 IN | SYSTOLIC BLOOD PRESSURE: 142 MMHG | DIASTOLIC BLOOD PRESSURE: 89 MMHG | HEART RATE: 83 BPM | TEMPERATURE: 98 F | OXYGEN SATURATION: 98 % | BODY MASS INDEX: 25.18 KG/M2 | WEIGHT: 170 LBS

## 2024-01-23 DIAGNOSIS — N20.1 RIGHT URETERAL STONE: Primary | ICD-10-CM

## 2024-01-23 DIAGNOSIS — N13.2 URETERAL STONE WITH HYDRONEPHROSIS: ICD-10-CM

## 2024-01-23 DIAGNOSIS — N20.1 RIGHT URETERAL STONE: ICD-10-CM

## 2024-01-23 PROCEDURE — 52356 CYSTO/URETERO W/LITHOTRIPSY: CPT | Performed by: UROLOGY

## 2024-01-23 PROCEDURE — 0TC68ZZ EXTIRPATION OF MATTER FROM RIGHT URETER, VIA NATURAL OR ARTIFICIAL OPENING ENDOSCOPIC: ICD-10-PCS | Performed by: UROLOGY

## 2024-01-23 PROCEDURE — BT1D1ZZ FLUOROSCOPY OF RIGHT KIDNEY, URETER AND BLADDER USING LOW OSMOLAR CONTRAST: ICD-10-PCS | Performed by: UROLOGY

## 2024-01-23 PROCEDURE — 74420 UROGRAPHY RTRGR +-KUB: CPT | Performed by: UROLOGY

## 2024-01-23 PROCEDURE — 0T768DZ DILATION OF RIGHT URETER WITH INTRALUMINAL DEVICE, VIA NATURAL OR ARTIFICIAL OPENING ENDOSCOPIC: ICD-10-PCS | Performed by: UROLOGY

## 2024-01-23 DEVICE — URETERAL STENT
Type: IMPLANTABLE DEVICE | Site: URETER | Status: FUNCTIONAL
Brand: ASCERTA™

## 2024-01-23 RX ORDER — ONDANSETRON 2 MG/ML
4 INJECTION INTRAMUSCULAR; INTRAVENOUS EVERY 6 HOURS PRN
Status: DISCONTINUED | OUTPATIENT
Start: 2024-01-23 | End: 2024-01-23

## 2024-01-23 RX ORDER — SODIUM CHLORIDE, SODIUM LACTATE, POTASSIUM CHLORIDE, CALCIUM CHLORIDE 600; 310; 30; 20 MG/100ML; MG/100ML; MG/100ML; MG/100ML
INJECTION, SOLUTION INTRAVENOUS CONTINUOUS PRN
Status: DISCONTINUED | OUTPATIENT
Start: 2024-01-23 | End: 2024-01-23 | Stop reason: SURG

## 2024-01-23 RX ORDER — HYDROMORPHONE HYDROCHLORIDE 1 MG/ML
0.6 INJECTION, SOLUTION INTRAMUSCULAR; INTRAVENOUS; SUBCUTANEOUS EVERY 5 MIN PRN
Status: DISCONTINUED | OUTPATIENT
Start: 2024-01-23 | End: 2024-01-23

## 2024-01-23 RX ORDER — ONDANSETRON 2 MG/ML
INJECTION INTRAMUSCULAR; INTRAVENOUS AS NEEDED
Status: DISCONTINUED | OUTPATIENT
Start: 2024-01-23 | End: 2024-01-23 | Stop reason: SURG

## 2024-01-23 RX ORDER — ACETAMINOPHEN 500 MG
1000 TABLET ORAL ONCE
Status: COMPLETED | OUTPATIENT
Start: 2024-01-23 | End: 2024-01-23

## 2024-01-23 RX ORDER — LABETALOL HYDROCHLORIDE 5 MG/ML
10 INJECTION, SOLUTION INTRAVENOUS EVERY 10 MIN PRN
Status: DISCONTINUED | OUTPATIENT
Start: 2024-01-23 | End: 2024-01-23

## 2024-01-23 RX ORDER — ROCURONIUM BROMIDE 10 MG/ML
INJECTION, SOLUTION INTRAVENOUS AS NEEDED
Status: DISCONTINUED | OUTPATIENT
Start: 2024-01-23 | End: 2024-01-23 | Stop reason: SURG

## 2024-01-23 RX ORDER — MORPHINE SULFATE 10 MG/ML
6 INJECTION, SOLUTION INTRAMUSCULAR; INTRAVENOUS EVERY 10 MIN PRN
Status: DISCONTINUED | OUTPATIENT
Start: 2024-01-23 | End: 2024-01-23

## 2024-01-23 RX ORDER — MIDAZOLAM HYDROCHLORIDE 1 MG/ML
INJECTION INTRAMUSCULAR; INTRAVENOUS AS NEEDED
Status: DISCONTINUED | OUTPATIENT
Start: 2024-01-23 | End: 2024-01-23 | Stop reason: SURG

## 2024-01-23 RX ORDER — PHENAZOPYRIDINE HYDROCHLORIDE 100 MG/1
100 TABLET, FILM COATED ORAL 3 TIMES DAILY PRN
Qty: 9 TABLET | Refills: 0 | Status: SHIPPED | OUTPATIENT
Start: 2024-01-23

## 2024-01-23 RX ORDER — IOPAMIDOL 612 MG/ML
INJECTION, SOLUTION INTRATHECAL AS NEEDED
Status: DISCONTINUED | OUTPATIENT
Start: 2024-01-23 | End: 2024-01-23 | Stop reason: HOSPADM

## 2024-01-23 RX ORDER — LIDOCAINE HYDROCHLORIDE 20 MG/ML
JELLY TOPICAL AS NEEDED
Status: DISCONTINUED | OUTPATIENT
Start: 2024-01-23 | End: 2024-01-23 | Stop reason: HOSPADM

## 2024-01-23 RX ORDER — HYDROMORPHONE HYDROCHLORIDE 1 MG/ML
0.4 INJECTION, SOLUTION INTRAMUSCULAR; INTRAVENOUS; SUBCUTANEOUS EVERY 5 MIN PRN
Status: DISCONTINUED | OUTPATIENT
Start: 2024-01-23 | End: 2024-01-23

## 2024-01-23 RX ORDER — SODIUM CHLORIDE, SODIUM LACTATE, POTASSIUM CHLORIDE, CALCIUM CHLORIDE 600; 310; 30; 20 MG/100ML; MG/100ML; MG/100ML; MG/100ML
INJECTION, SOLUTION INTRAVENOUS CONTINUOUS
Status: DISCONTINUED | OUTPATIENT
Start: 2024-01-23 | End: 2024-01-23

## 2024-01-23 RX ORDER — SULFAMETHOXAZOLE AND TRIMETHOPRIM 800; 160 MG/1; MG/1
1 TABLET ORAL 2 TIMES DAILY
Qty: 10 TABLET | Refills: 0 | Status: SHIPPED | OUTPATIENT
Start: 2024-01-23 | End: 2024-01-28

## 2024-01-23 RX ORDER — MORPHINE SULFATE 4 MG/ML
2 INJECTION, SOLUTION INTRAMUSCULAR; INTRAVENOUS EVERY 10 MIN PRN
Status: DISCONTINUED | OUTPATIENT
Start: 2024-01-23 | End: 2024-01-23

## 2024-01-23 RX ORDER — DEXAMETHASONE SODIUM PHOSPHATE 4 MG/ML
VIAL (ML) INJECTION AS NEEDED
Status: DISCONTINUED | OUTPATIENT
Start: 2024-01-23 | End: 2024-01-23 | Stop reason: SURG

## 2024-01-23 RX ORDER — HYDRALAZINE HYDROCHLORIDE 20 MG/ML
5 INJECTION INTRAMUSCULAR; INTRAVENOUS EVERY 10 MIN PRN
Status: DISCONTINUED | OUTPATIENT
Start: 2024-01-23 | End: 2024-01-23

## 2024-01-23 RX ORDER — LIDOCAINE HYDROCHLORIDE 10 MG/ML
INJECTION, SOLUTION EPIDURAL; INFILTRATION; INTRACAUDAL; PERINEURAL AS NEEDED
Status: DISCONTINUED | OUTPATIENT
Start: 2024-01-23 | End: 2024-01-23 | Stop reason: SURG

## 2024-01-23 RX ORDER — MORPHINE SULFATE 4 MG/ML
4 INJECTION, SOLUTION INTRAMUSCULAR; INTRAVENOUS EVERY 10 MIN PRN
Status: DISCONTINUED | OUTPATIENT
Start: 2024-01-23 | End: 2024-01-23

## 2024-01-23 RX ORDER — NALOXONE HYDROCHLORIDE 0.4 MG/ML
80 INJECTION, SOLUTION INTRAMUSCULAR; INTRAVENOUS; SUBCUTANEOUS AS NEEDED
Status: DISCONTINUED | OUTPATIENT
Start: 2024-01-23 | End: 2024-01-23

## 2024-01-23 RX ORDER — HYDROMORPHONE HYDROCHLORIDE 1 MG/ML
0.2 INJECTION, SOLUTION INTRAMUSCULAR; INTRAVENOUS; SUBCUTANEOUS EVERY 5 MIN PRN
Status: DISCONTINUED | OUTPATIENT
Start: 2024-01-23 | End: 2024-01-23

## 2024-01-23 RX ORDER — SODIUM CHLORIDE 9 MG/ML
INJECTION, SOLUTION INTRAVENOUS CONTINUOUS
Status: DISCONTINUED | OUTPATIENT
Start: 2024-01-23 | End: 2024-01-23

## 2024-01-23 RX ORDER — PHENAZOPYRIDINE HYDROCHLORIDE 200 MG/1
200 TABLET, FILM COATED ORAL ONCE AS NEEDED
Status: COMPLETED | OUTPATIENT
Start: 2024-01-23 | End: 2024-01-23

## 2024-01-23 RX ADMIN — SODIUM CHLORIDE, SODIUM LACTATE, POTASSIUM CHLORIDE, CALCIUM CHLORIDE: 600; 310; 30; 20 INJECTION, SOLUTION INTRAVENOUS at 15:01:00

## 2024-01-23 RX ADMIN — MIDAZOLAM HYDROCHLORIDE 2 MG: 1 INJECTION INTRAMUSCULAR; INTRAVENOUS at 13:57:00

## 2024-01-23 RX ADMIN — ROCURONIUM BROMIDE 50 MG: 10 INJECTION, SOLUTION INTRAVENOUS at 14:00:00

## 2024-01-23 RX ADMIN — LIDOCAINE HYDROCHLORIDE 50 MG: 10 INJECTION, SOLUTION EPIDURAL; INFILTRATION; INTRACAUDAL; PERINEURAL at 13:59:00

## 2024-01-23 RX ADMIN — ONDANSETRON 4 MG: 2 INJECTION INTRAMUSCULAR; INTRAVENOUS at 14:40:00

## 2024-01-23 RX ADMIN — DEXAMETHASONE SODIUM PHOSPHATE 8 MG: 4 MG/ML VIAL (ML) INJECTION at 14:37:00

## 2024-01-23 RX ADMIN — SODIUM CHLORIDE, SODIUM LACTATE, POTASSIUM CHLORIDE, CALCIUM CHLORIDE: 600; 310; 30; 20 INJECTION, SOLUTION INTRAVENOUS at 13:56:00

## 2024-01-23 NOTE — ANESTHESIA PREPROCEDURE EVALUATION
Anesthesia PreOp Note    HPI:     Romeo Pendleton is a 48 year old male who presents for preoperative consultation requested by: Vicente Han MD    Date of Surgery: 1/23/2024    Procedure(s):  Cystoscopy, right retrograde pyelography, right ureteroscopy, laser lithotripsy, stone removal, stent insertion.  CYSTOSCOPY URETEROSCOPY  LASER HOLMIUM LITHOTRIPSY  CYSTOSCOPY STENT INSERTION  Indication: Right ureteral stone [N20.1]    Relevant Problems   No relevant active problems       NPO:  Last Liquid Consumption Date: 01/22/24  Last Liquid Consumption Time: 1900  Last Solid Consumption Date: 01/22/24  Last Solid Consumption Time: 1900  Last Liquid Consumption Date: 01/22/24          History Review:  Patient Active Problem List    Diagnosis Date Noted    Right ureteral stone 01/17/2024    Ureteral stone with hydronephrosis 01/17/2024    Incidental lung nodule 10/20/2018    Hyperlipemia, mixed 10/20/2018    GERD (gastroesophageal reflux disease) 11/30/2016    Allergic rhinitis 11/30/2016    BPH with obstruction/lower urinary tract symptoms 04/26/2016    Dyspnea and respiratory abnormality 01/28/2014    Benign neoplasm of skin of upper limb, including shoulder 01/26/2014    Benign neoplasm of skin of trunk 01/26/2014    Neoplasm of uncertain behavior of skin 01/26/2014    Closed fracture of nasal bones 01/04/2013    Disease of pericardium 06/28/2012    Acute pericarditis 06/19/2012    Otitis media 01/03/2011    Acute upper respiratory infection 01/11/2010    Allergic rhinitis due to pollen 04/04/2009       Past Medical History:   Diagnosis Date    Acute pharyngitis     Allergic rhinitis     Calculus of kidney     Cardiomyopathy (HCC)     Chronic sinusitis 2004    Hx of motion sickness     Lipid screening 01/28/2014    Seasonal allergies     Visual impairment        Past Surgical History:   Procedure Laterality Date    COLONOSCOPY SCREENING - REFERRAL N/A 8/24/2022    Procedure: COLONOSCOPY-SCREENING;  Surgeon:  Gail Clemente MD;  Location: [facility] ENDOSCOPY    OTHER SURGICAL HISTORY      Chronic sinusitis, sinus surgery     VASECTOMY         Medications Prior to Admission   Medication Sig Dispense Refill Last Dose    loratadine-pseudoephedrine ER (WAL-ITIN D 24 HOUR)  MG Oral Tablet 24 Hr Take 1 tablet by mouth daily. 90 tablet 1 2024 at 0730    tamsulosin 0.4 MG Oral Cap Take 1 capsule (0.4 mg total) by mouth daily with dinner. Take 1/2 hour following the same meal each day. Stop if you pass the stone. 30 capsule 0 2024    [] traMADol 50 MG Oral Tab Take 1-2 tablets ( mg total) by mouth every 6 (six) hours as needed for Pain. 10 tablet 0     [] ondansetron 4 MG Oral Tablet Dispersible Take 1 tablet (4 mg total) by mouth every 4 (four) hours as needed for Nausea. 10 tablet 0     Omeprazole 40 MG Oral Capsule Delayed Release TAKE 1 CAPSULE BY MOUTH ONCE DAILY BEFORE A MEAL 90 capsule 3 2024 at 0800     Current Facility-Administered Medications Ordered in Epic   Medication Dose Route Frequency Provider Last Rate Last Admin    cefTRIAXone (Rocephin) 1 g in D5W 100 mL IVPB-ADD  1 g Intravenous Once Vicente Han MD        gentamicin (Garamycin) 240 mg in sodium chloride 0.9% 100 mL IVPB  240 mg Intravenous Once Vicente Han MD        sodium chloride 0.9% infusion   Intravenous Continuous Vicente Han MD 20 mL/hr at 24 1158 New Bag at 24 1158     No current [facility]-ordered outpatient medications on file.       Allergies   Allergen Reactions    Mildew ITCHING    Mold ITCHING    Pollen ITCHING       Family History   Problem Relation Age of Onset    Diabetes Mother     Cancer Maternal Grandfather     Lung Disorder Paternal Grandfather         Emphysema     Social History     Socioeconomic History    Marital status:    Tobacco Use    Smoking status: Some Days     Types: Cigars    Smokeless tobacco: Never    Tobacco comments:     3-6 cigars per year   Vaping  Use    Vaping Use: Never used   Substance and Sexual Activity    Alcohol use: Yes     Comment: weekly    Drug use: No   Other Topics Concern    Caffeine Concern Yes     Comment: Weekly; a couple, soda     Pt has a pacemaker No    Pt has a defibrillator No    Reaction to local anesthetic No       Available pre-op labs reviewed.  Lab Results   Component Value Date    WBC 7.5 01/14/2024    RBC 5.71 (H) 01/14/2024    HGB 15.7 01/14/2024    HCT 46.8 01/14/2024    MCV 82.0 01/14/2024    MCH 27.5 01/14/2024    MCHC 33.5 01/14/2024    RDW 12.8 01/14/2024    .0 01/14/2024     Lab Results   Component Value Date     01/14/2024    K 3.7 01/14/2024     01/14/2024    CO2 28.0 01/14/2024    BUN 12 01/14/2024    CREATSERUM 0.92 01/14/2024     (H) 01/14/2024    CA 8.8 01/14/2024     Lab Results   Component Value Date    INR 1.03 01/18/2024       Vital Signs:  Body mass index is 25.1 kg/m².   height is 1.753 m (5' 9\") and weight is 77.1 kg (170 lb). His oral temperature is 98.3 °F (36.8 °C). His blood pressure is 143/87 and his pulse is 84. His respiration is 20 and oxygen saturation is 96%.   Vitals:    01/18/24 1147 01/23/24 1146   BP:  143/87   Pulse:  84   Resp:  20   Temp:  98.3 °F (36.8 °C)   TempSrc:  Oral   SpO2:  96%   Weight: 81.6 kg (180 lb) 77.1 kg (170 lb)   Height: 1.753 m (5' 9\") 1.753 m (5' 9\")        Anesthesia Evaluation     Patient summary reviewed and Nursing notes reviewed    No history of anesthetic complications   Airway   Mallampati: II  TM distance: >3 FB  Neck ROM: full  Dental - Dentition appears grossly intact     Pulmonary - normal exam   (+) shortness of breath    ROS comment: + smokes cigars in the summer. NO sob  Cardiovascular - normal exam  Exercise tolerance: good    ECG reviewed  ROS comment: Pericarditis 12 years ago, now resolved     Recently had high BP when kidney stone was discovered in the ED. Later went to his PCP and BP was normal. Not currently on blood pressure  medication. No chest pain or headache in pre-op.    Neuro/Psych - negative ROS     GI/Hepatic/Renal    (+) GERD (Takes Omeprazole daily) well controlled    Endo/Other - negative ROS   Abdominal  - normal exam                 Anesthesia Plan:   ASA:  2  Plan:   General  Monitors and Lines:   BIS  Airway:  ETT  Post-op Pain Management: IV analgesics  Informed Consent Plan and Risks Discussed With:  Patient  Discussed plan with:  Surgeon      I have informed Romeo Pendleton and/or legal guardian or family member of the nature of the anesthetic plan, benefits, risks including possible dental damage if relevant, major complications, and any alternative forms of anesthetic management.   All of the patient's questions were answered to the best of my ability. The patient desires the anesthetic management as planned.  Leeann Joseph MD  1/23/2024 1:44 PM  Present on Admission:   Right ureteral stone   Ureteral stone with hydronephrosis

## 2024-01-23 NOTE — ANESTHESIA POSTPROCEDURE EVALUATION
Patient: Romeo Pendleton    Procedure Summary       Date: 01/23/24 Room / Location: [facility] MAIN OR 14 / [facility] MAIN OR    Anesthesia Start: 1355 Anesthesia Stop: 1503    Procedures:       Cystoscopy, right retrograde pyelography, right ureteroscopy, laser lithotripsy, stone removal, right ureteral stent insertion. (Right)      CYSTOSCOPY URETEROSCOPY (Right)      LASER HOLMIUM LITHOTRIPSY (Right)      CYSTOSCOPY STENT INSERTION (Right) Diagnosis:       Right ureteral stone      (Right ureteral stone [N20.1])    Surgeons: Vicente Han MD Anesthesiologist: Leeann Joseph MD    Anesthesia Type: general ASA Status: 2            Anesthesia Type: general    Vitals Value Taken Time   /91 01/23/24 1502   Temp 97.9 °F (36.6 °C) 01/23/24 1502   Pulse 84 01/23/24 1503   Resp 16 01/23/24 1502   SpO2 99 % 01/23/24 1503   Vitals shown include unfiled device data.    [facility] AN Post Evaluation:   Patient Evaluated in PACU  Patient Participation: complete - patient participated  Level of Consciousness: awake and alert  Pain Score: 0  Pain Management: adequate  Airway Patency:patent  Dental exam unchanged from preop  Yes    Cardiovascular Status: acceptable and hemodynamically stable  Respiratory Status: acceptable, nonlabored ventilation, spontaneous ventilation and nasal cannula  Postoperative Hydration acceptable  Comments: Resting comfortably in PACU, awake and alert, breathing easily through nasal cannula. Denies pain or nausea.       Leeann Joseph MD  1/23/2024 3:03 PM

## 2024-01-23 NOTE — ANESTHESIA PROCEDURE NOTES
Airway  Date/Time: 1/23/2024 2:03 PM  Urgency: Elective    Airway not difficult    General Information and Staff    Patient location during procedure: OR  Anesthesiologist: Leeann Joseph MD  Performed: anesthesiologist   Performed by: Leeann Joseph MD  Authorized by: Leeann Joseph MD      Indications and Patient Condition  Indications for airway management: anesthesia  Spontaneous Ventilation: absent  Sedation level: deep  Preoxygenated: yes  Patient position: sniffing  Mask difficulty assessment: 1 - vent by mask  Planned trial extubation    Final Airway Details  Final airway type: endotracheal airway      Successful airway: ETT  Cuffed: yes   Successful intubation technique: direct laryngoscopy  Facilitating devices/methods: intubating stylet and cricoid pressure  Endotracheal tube insertion site: oral  Blade: Delbert  Blade size: #3  ETT size (mm): 7.5    Cormack-Lehane Classification: grade III - view of epiglottis only  Placement verified by: capnometry   Measured from: lips  ETT to lips (cm): 23  Number of attempts at approach: 1  Number of other approaches attempted: 0    Additional Comments  Intubated easily on first attempt, no dental or soft tissue damage. No signs of aspiration.

## 2024-01-23 NOTE — TELEPHONE ENCOUNTER
I scheduled pt for 2/1/24 as per [facility] Pt is still at [facility] will call pt later to inform of date and time. My chart message sent.

## 2024-01-23 NOTE — TELEPHONE ENCOUNTER
Status post right ureteroscopy with laser lithotripsy and stent insertion on 1/23/2024.  Please contact patient and schedule for office cystoscopy and ureteral stent removal on Thursday 2/1/2024.    Thanks    Vicente Han MD  1/23/2024

## 2024-01-23 NOTE — INTERVAL H&P NOTE
Pre-op Diagnosis: Right ureteral stone [N20.1]    The above referenced H&P was reviewed by Vicente Han MD on 1/23/2024, the patient was examined and no significant changes have occurred in the patient's condition since the H&P was performed. He has been having renal colic. He has been straining his urine and has not noticed passing the stone. Last pain was yesterday afternoon. He elected to proceed with cystoscopy, definitive right ureteroscopy, laser lithotripsy, stone removal and stent insertion.  I discussed with the patient and/or legal representative the potential benefits, risks and side effects of this procedure; the likelihood of the patient achieving goals; and potential problems that might occur during recuperation.  I discussed reasonable alternatives to the procedure, including risks, benefits and side effects related to the alternatives and risks related to not receiving this procedure.  We will proceed with procedure as planned.

## 2024-01-23 NOTE — OPERATIVE REPORT
[facility]  Urology Operative Note         Romeo Pendleton Location: OR   [facility] [id number] MRN [id number]   Admission Date 1/23/2024 Operation Date 1/23/2024   Attending Physician Vicente Han MD       Patient Name: Romeo Pendleton     Preoperative Diagnosis: Right ureteral stone [N20.1]     Postoperative Diagnosis: Right ureteral stone [N20.1]     Procedure(s): Cystoscopy, right retrograde pyelography, right ureteroscopy, laser lithotripsy, stone removal, right ureteral stent insertion.    Primary Surgeon: Vicente Han MD     Anesthesia: General     Specimen:   ID Type Source Tests Collected by Time Destination   1 : 1)RIGHT URETERAL STONE FRAGMENTS-FRESH Calculus Stone (calculus) CALCULI, URINARY, SURGICAL PATHOLOGY TISSUE Vicente Han MD 1/23/2024  2:36 PM         Estimated Blood Loss: 1 mL.   Complications: None.    Indications for procedure: Patient is a pleasant 48-year-old male who was diagnosed with a 6 mm obstructing right distal ureteral stone upon ER visit 1/14/2024 for evaluation of right flank pain.  Patient has failed medical expulsive therapy and after discussing management options elected to proceed with cystoscopy, right ureteroscopy, laser lithotripsy, stone removal, and stent insertion for definitive management.  Procedure discussed in detail including rationale, approach, benefits, risks, possible complications, reasonable alternatives of treatment.  We discussed surgical risks including not limited to medical and anesthetic complications, bleeding, infection, damage to surrounding organs or structures, need for additional procedures. The expected postoperative course of recovery was discussed.  We discussed eventual need for stent removal once stone treatment is completed.  Preop urine culture was negative.  Patient signed informed consent and wishes to proceed.    Surgical Findings: Right distal ureteral stone, completely fragmented using holmium laser.   Stone fragments extracted using Nitinol basket.  No residual stones in the right ureter.  6 Mauritian by 26 cm double-J right ureteral stent inserted.    Operative Summary:  The patient was brought to the operating room and identified by their wristband including their name, medical record number, and date of birth. They were transferred to the operating room table. Appropriate monitoring devices were connected to the patient. SCD's were applied to the bilateral lower extremities for DVT prophylaxis. Successful induction of general level endotracheal anesthesia was achieved. IV antibiotics were administered for surgical prophylaxis. The patient was then positioned in dorsal lithotomy with their legs in Randy stirrups and all pressure areas and bony prominences padded appropriately. The surgical site was prepped and draped in standard sterile fashion. A full surgical timeout was performed with agreement upon all of its components.    A 22 Mauritian rigid cystoscope with a 30 degree lens was inserted per urethra and advanced to the bladder.  The bladder was entered atraumatically.  No evidence of urethral strictures, masses, stones, or lesions noted.  The prostate was mildly to moderately enlarged.  Pan cystoscopy revealed no evidence of bladder tumors, masses, stones, or lesions.  The bilateral UOs were identified in their orthotopic locations.  There was some erythema surrounding the right UO.  A 0.035 sensor guidewire was then introduced through the cystoscope into the right ureteral orifice and advanced under fluoroscopic guidance to the level of the right kidney.  The cystoscope was removed while maintaining the guidewire in place.    Next, a Velez semirigid ureteroscope was inserted alongside the wire into the bladder and into the right ureter past the ureteral orifice.  I was easily able to identify the stone within the distal right ureter.  It measured about 6 mm in size.  A 365 µm [employer] holmium laser fiber was  introduced and laser lithotripsy was performed with complete fragmentation of the stone.  A 1.9 Maltese 0 tip nitinol basket was introduced and the stone fragments were engaged, removed, and deposited in the bladder for later retrieval.  Completion ureteroscopy was performed to the proximal right ureter.  No residual stones, masses, or lesions noted.    The ureteroscope was removed while maintaining the safety wire in place.  The guidewire was backloaded onto the cystoscope which was reintroduced into the bladder.  A 5 Maltese open-ended catheter was inserted over the guidewire and advanced under fluoroscopic guidance to the level of the right kidney.  The wire was removed.  Right retrograde pyelography was performed revealing moderate right hydroureteronephrosis.  No filling defects or contrast extravasation.  The wire was replaced and coiled in the kidney under fluoroscopic guidance.  The open-ended catheter was removed.  Over the guidewire, a [employer] 6 Maltese by 26 cm double-J ureteral stent was inserted and advanced under fluoroscopic and visual guidance to the level of the right kidney.  The guidewire was removed.  Proximal and distal coils were noted.  The stent position was adjusted using a flexible grasper until there was a nice coil in the kidney and a nice curl in the bladder.  Excellent drainage was noted through and around the stent.    The bladder was emptied and the stone fragments were retrieved through the cystoscope and sent for chemical analysis.  The cystoscope was removed.  10 mL of 2% lidocaine jelly were instilled per urethra for postoperative analgesia.    This concluded our procedure.  Patient was repositioned supine, general anesthesia was reversed and he was successfully extubated and transported to the recovery room in a stable condition.  He tolerated the procedure well without any immediate complications.       Implants:   Implant Name Type Inv. Item Serial No.  Lot  No. LRB No. Used Action   ASCERTA STENT URET 26CM 6FR - SR#[id number]  ASCERTA STENT URET 26CM 6FR R#[id number] [employer] WD [id number] Right 1 Implanted      Drains: 6 Belarusian by 26 cm double-J right ureteral stent.    Condition: Extubated and stable to PACU.    I was present, scrubbed, and performed the procedure in its entirety.  At patient's request, findings were discussed with his wife over the phone following the procedure.      Vicente Han MD

## 2024-01-24 NOTE — TELEPHONE ENCOUNTER
Spoke with patient he confirmed to arrive at 2pm for 2:30pm procedure.     Future Appointments   Date Time Provider Department Center   2/1/2024  2:30 PM Vicente Han MD [facility]

## 2024-02-01 ENCOUNTER — PROCEDURE (OUTPATIENT)
Dept: SURGERY | Facility: CLINIC | Age: 49
End: 2024-02-01
Payer: COMMERCIAL

## 2024-02-01 VITALS — HEART RATE: 108 BPM | DIASTOLIC BLOOD PRESSURE: 85 MMHG | SYSTOLIC BLOOD PRESSURE: 151 MMHG

## 2024-02-01 DIAGNOSIS — N20.1 RIGHT URETERAL STONE: Primary | ICD-10-CM

## 2024-02-01 LAB
CAOX MONOHYDRATE: 100 %
WEIGHT-STONE: 38 MG

## 2024-02-01 PROCEDURE — 52310 CYSTOSCOPY AND TREATMENT: CPT | Performed by: UROLOGY

## 2024-02-01 PROCEDURE — 3077F SYST BP >= 140 MM HG: CPT | Performed by: UROLOGY

## 2024-02-01 PROCEDURE — 3079F DIAST BP 80-89 MM HG: CPT | Performed by: UROLOGY

## 2024-02-01 NOTE — PROGRESS NOTES
[facility]  Follow-Up Visit    HPI: Romeo Pendleton is a 48 year old male presents for a follow up visit. Patient was last seen on 1/15/24.    INTERVAL HISTORY: s/p right ureteroscopy with laser lithotripsy, stone removal and stent insertion on 1/23/24 for definite management of an obstructing, symptomatic 6 mm right distal ureteral stone.    Stone analysis is still pending.    Here for office cystoscopy and stent removal.  He denies any fevers or chills, gross hematuria or dysuria.  Reports some mild stent related discomfort.      1. Right ureteral stone  Patient denies previous history of nephrolithiasis.     He reports sudden onset colicky right flank pain starting 1/14/2024.  Associated with nausea and dry heaving.  Pain got severe and he presented to the ER on 1/14/2024.     CT revealed a 6 x 4 mm obstructing right distal ureteral stone with upstream hydronephrosis. No additional urolithiasis noted.     UA with microhematuria but no signs of UTI.  WBC count within normal.     His pain was controlled and he was discharged home with prescriptions for pain medicine and antinausea medication.     He has been straining his urine.  He has not passed the stone.  He still has intermittent right loin pain.  He denies fevers or chills.  No gross hematuria or dysuria.        PAST MEDICAL HISTORY: GERD.     PAST SURGICAL HISTORY: Sinus surgery.  Vasectomy.  Right ureteroscopy with laser lithotripsy 1/23/2024.     SOCIAL HISTORY: .  Has 2 sons.  Occasional cigar smoking.  No cigarette smoking. Social alcohol.  Works as a .     Reviewed past medical, surgical, family, and social history.  Reviewed med list and allergies.      REVIEW OF SYSTEMS:  Pertinent positives and negatives per HPI. A 10-point ROS was performed and is otherwise negative.       EXAM:  /85 (BP Location: Left arm, Patient Position: Sitting, Cuff Size: adult)   Pulse 108     Physical  Exam  Constitutional:       Appearance: He is well-developed.   HENT:      Head: Normocephalic.   Eyes:      General: No scleral icterus.  Cardiovascular:      Rate and Rhythm: Normal rate.   Pulmonary:      Effort: Pulmonary effort is normal.   Skin:     General: Skin is warm and dry.   Neurological:      Mental Status: He is alert and oriented to person, place, and time.   Psychiatric:         Mood and Affect: Mood normal.         Behavior: Behavior normal.       PATHOLOGY:  No results found.      LABS:  No results found.      IMAGING:  XR OR - N/C    Result Date: 1/23/2024  PROCEDURE: XR OR - N/C  COMPARISON: None.  INDICATIONS: Cystoscopy, right retrograde pyelography, right ureteroscopy, laser lithotripsy, stone removal, stent insertion.under fluoroscopy.  FINDINGS:   Intraoperative exam was performed. Total fluoroscopy time was 28.9 seconds. A total of 4 images were obtained. RADIATION DOSE (Dose Area Product):  2.3687-Gy*cm^2   There has been there has been placement of a right double-J nephroureteral stent.         CONCLUSION: Intraoperative exam. Please see operative report for further details.    Dictated by (CST): Luis Miguel Harris MD on 1/23/2024 at 4:18 PM     Finalized by (CST): Luis Miguel Harris MD on 1/23/2024 at 4:19 PM          CT ABDOMEN+PELVIS KIDNEYSTONE 2D RNDR(NO IV,NO ORAL)(CPT=74176)    Result Date: 1/14/2024  PROCEDURE:   CT ABDOMEN + PELVIS KIDNEYSTONE 2D RNDR (NO IV NO ORAL) (CPT=74176)  COMPARISON: None.  INDICATIONS: Acute right flank pain with nausea and vomiting.  TECHNIQUE:   CT images of the abdomen and pelvis were obtained without intravenous contrast material.  Automated exposure control for dose reduction was used. Adjustment of the mA and/or kV was done based on the patient's size. Use of iterative reconstruction technique for dose reduction was used. Dose information is transmitted to the ACR (American College of Radiology) NRDR (National Radiology Data Registry) which includes the Dose  Index Registry.  FINDINGS:  KIDNEYS:   Moderate right hydroureteronephrosis extending from the renal calices into the distal right ureter, in this is related to an obstructing 4 x 6 mm stone in the distal ureter, located approximately 2 cm from the ureterovesicular junction (UVJ).  There is fat stranding around the right kidney and right ureter upstream from the stone.  No additional calculi in the kidneys or ureters.  Contralateral left kidney is without hydronephrosis. ADRENALS:   No nodule or enlargement. URINARY BLADDER: Urinary bladder is incompletely distended.  The base the bladder is deformed by the prostate. LIVER: No enlargement, atrophy, abnormal density, or significant focal lesion.  BILIARY: The gallbladder is present.  No intra- or extrahepatic biliary ductal dilatation. SPLEEN: No enlargement or focal lesion.  STOMACH: Small hiatal hernia.  No gastric obstruction.  Duodenum is unremarkable. PANCREAS: No lesion, fluid collection, ductal dilatation, or atrophy.  AORTA/VASCULAR:   Trace calcific atherosclerosis in the abdominal aorta.  No aneurysm RETROPERITONEUM: No mass or enlarged adenopathy.  BOWEL/MESENTERY:  There is no small or large bowel obstruction. The terminal ileum and appendix (series 2, image 104) are within normal limits. There is no significant colonic diverticulosis. There is no free air, free fluid, or lymphadenopathy in the abdomen or pelvis. ABDOMINAL WALL: Small fat containing inguinal hernias. PELVIC NODES: No enlarged mass or adenopathy.   PELVIC ORGANS: Prostate is enlarged at 54 mm in transverse diameter. BONES:   Scattered endplate osteophytes within the spine.  Scattered Schmorl's nodes.  No suspicious bone lesion. LUNG BASES: Minimal dependent subsegmental atelectasis in the visualized lung bases. OTHER: Negative.          CONCLUSION:  1.  Moderate right hydroureteronephrosis related to an obstructing 4 x 6 mm stone in the distal right ureter located approximately 2 cm from  the UVJ.  No additional calculi in the kidneys, ureters, or bladder. 2.  Prostate enlargement. 3.  Small fat containing inguinal hernias.    Dictated by (CST): Jose Bernal MD on 1/14/2024 at 10:19 AM     Finalized by (RACQUEL): Jose Bernal MD on 1/14/2024 at 10:23 AM            UROLOGY PROCEDURE:    CYSTOURETHROSCOPY WITH STENT REMOVAL  Informed consent was obtained for the procedure. The site was prepped and draped in the usual sterile fashion. An Ambu 16 Yakut flexible cystoscope was utilized for the procedure.    Anesthesia:  2% lidocaine gel.    Urethra: Normal.    Prostate / Pelvic: Normal.    Bladder: Normal.  No tumor, stone, diverticulum, or glomerulation.    U.O's: Normal with a ureteral stent emanating from the right UO.    The distal coil of the right ureteral stent was visualized and grasped using a flexible cystoscopic grasper.  The stent and cystoscope were then removed through the urethral meatus.  The stent was grossly identified to be intact and disposed off.  Patient tolerated the procedure well.    POST CYSTOSCOPY MEDICATIONS: sample one tablet Cipro 500 mg given to patient.    DIAGNOSIS: Cystoscopy with successful removal of right ureteral stent.       IMPRESSION:  48 year old male status post right ureteroscopy with laser lithotripsy, stone removal, and stent insertion on 1/23/2024 for definitive management of a 6 mm obstructing, symptomatic right distal ureteral stone which has failed medical expulsive therapy.    Status post successful cystoscopy and right ureteral stent removal in the office today.  Patient tolerated the procedure well.    The stone analysis results are still pending.  We discussed general stone friendly dietary modifications to reduce future risk of stone formation.  More recommendations may be made depending on stone analysis results once available.    We recommended obtaining a follow-up renal ultrasound in a few weeks to ensure resolution of hydronephrosis.  Patient  agreeable.    All questions answered.      PLAN:  1.  Await calculus analysis results.    2.  Stone friendly diet.    3.  Follow-up renal ultrasound in a few weeks to ensure resolution of hydronephrosis.  Patient will be notified of the results.      Vicente Han MD  2/1/2024

## 2024-02-16 ENCOUNTER — HOSPITAL ENCOUNTER (OUTPATIENT)
Dept: ULTRASOUND IMAGING | Facility: HOSPITAL | Age: 49
Discharge: HOME | End: 2024-02-16
Attending: UROLOGY
Payer: COMMERCIAL

## 2024-02-16 DIAGNOSIS — N20.1 RIGHT URETERAL STONE: ICD-10-CM

## 2024-02-16 PROCEDURE — 76775 US EXAM ABDO BACK WALL LIM: CPT | Performed by: UROLOGY

## 2024-07-30 DIAGNOSIS — T78.40XD ALLERGY, SUBSEQUENT ENCOUNTER: ICD-10-CM

## 2024-08-02 RX ORDER — OMEPRAZOLE 40 MG/1
CAPSULE, DELAYED RELEASE ORAL
Qty: 90 CAPSULE | Refills: 3 | Status: SHIPPED | OUTPATIENT
Start: 2024-08-02

## 2024-08-02 NOTE — TELEPHONE ENCOUNTER
Please review. Protocol Failed; No Protocol      Recent fills: 4/16/2024  Last Rx written: 1/17/2024  Last office visit: 1/17/2024          Requested Prescriptions   Pending Prescriptions Disp Refills    LORATADINE-D 24HR  MG Oral Tablet 24 Hr [Pharmacy Med Name: LORATADINE-D 24 HR TABLETS] 90 tablet 0     Sig: TAKE 1 TABLET BY MOUTH DAILY       There is no refill protocol information for this order      Signed Prescriptions Disp Refills    Omeprazole 40 MG Oral Capsule Delayed Release 90 capsule 3     Sig: TAKE 1 CAPSULE BY MOUTH EVERY DAY BEFORE A MEAL       Gastrointestional Medication Protocol Passed - 7/30/2024  9:28 PM        Passed - In person appointment or virtual visit in the past 12 mos or appointment in next 3 mos     Recent Outpatient Visits              6 months ago Right ureteral stone    [facility], Lana Castellanos MD    Office Visit    6 months ago [facility]    [facility]    9 months ago [facility]    [facility]    1 year ago SK (seborrheic keratosis)    [facility]    Office Visit    1 year ago Acute URI    [place] Braulio Acuna MD    Office Visit          Future Appointments         Provider Department Appt Notes    In 2 months Gosia Walters MD [place] skin check                           Future Appointments         Provider Department Appt Notes    In 2 months Gosia Walters MD [place] skin check          Recent Outpatient Visits              6 months ago [facility]    [facility]  Visit    6 months ago Right ureteral stone    [facility], Vicente Mendez MD    Office Visit    9 months ago Annual physical exam    [facility], Lana Castellanos MD    Office Visit    1 year ago SK (seborrheic keratosis)    [facility], Gosia Cortés MD    Office Visit    1 year ago Acute URI    [facility], Braulio Reilly MD    Office Visit

## 2024-08-04 RX ORDER — LORATADINE/PSEUDOEPHEDRINE 10MG-240MG
1 TABLET, EXTENDED RELEASE 24 HR ORAL DAILY
Qty: 90 TABLET | Refills: 0 | Status: SHIPPED | OUTPATIENT
Start: 2024-08-04

## 2024-10-11 ENCOUNTER — OFFICE VISIT (OUTPATIENT)
Dept: AUDIOLOGY | Facility: CLINIC | Age: 49
End: 2024-10-11

## 2024-10-11 DIAGNOSIS — H90.42 SENSORINEURAL HEARING LOSS (SNHL) OF LEFT EAR WITH UNRESTRICTED HEARING OF RIGHT EAR: Primary | ICD-10-CM

## 2024-10-11 PROCEDURE — 92552 PURE TONE AUDIOMETRY AIR: CPT | Performed by: AUDIOLOGIST

## 2024-10-11 PROCEDURE — 92556 SPEECH AUDIOMETRY COMPLETE: CPT | Performed by: AUDIOLOGIST

## 2024-11-01 ENCOUNTER — OFFICE VISIT (OUTPATIENT)
Dept: AUDIOLOGY | Facility: CLINIC | Age: 49
End: 2024-11-01

## 2024-11-01 ENCOUNTER — OFFICE VISIT (OUTPATIENT)
Dept: OTOLARYNGOLOGY | Facility: CLINIC | Age: 49
End: 2024-11-01
Payer: COMMERCIAL

## 2024-11-01 VITALS — HEIGHT: 69 IN | WEIGHT: 170 LBS | BODY MASS INDEX: 25.18 KG/M2

## 2024-11-01 DIAGNOSIS — H93.12 TINNITUS OF LEFT EAR: ICD-10-CM

## 2024-11-01 DIAGNOSIS — H90.42 SENSORINEURAL HEARING LOSS (SNHL) OF LEFT EAR WITH UNRESTRICTED HEARING OF RIGHT EAR: Primary | ICD-10-CM

## 2024-11-01 DIAGNOSIS — H91.8X2 OTHER SPECIFIED HEARING LOSS OF LEFT EAR, UNSPECIFIED HEARING STATUS ON CONTRALATERAL SIDE: Primary | ICD-10-CM

## 2024-11-01 DIAGNOSIS — H91.8X2 OTHER SPECIFIED HEARING LOSS, LEFT EAR: ICD-10-CM

## 2024-11-01 PROCEDURE — 92557 COMPREHENSIVE HEARING TEST: CPT | Performed by: AUDIOLOGIST

## 2024-11-01 PROCEDURE — 92567 TYMPANOMETRY: CPT | Performed by: AUDIOLOGIST

## 2024-11-01 PROCEDURE — 3008F BODY MASS INDEX DOCD: CPT | Performed by: OTOLARYNGOLOGY

## 2024-11-01 PROCEDURE — 99203 OFFICE O/P NEW LOW 30 MIN: CPT | Performed by: OTOLARYNGOLOGY

## 2024-11-01 NOTE — PROGRESS NOTES
NEW PATIENT PROGRESS NOTE  OTOLOGY/OTOLARYNGOLOGY    REF MD:  No referring provider defined for this encounter.     PCP: YAIMA VARGAS MD    CHIEF COMPLAINT:    Chief Complaint   Patient presents with    New Patient    Hearing Loss     Hearing loss; had hearing test on 10/11/24     HISTORY OF PRESENT ILLNESS: Romeo Pendleton is a 49 year old male who presents for evaluation of hearing loss. The patient reports difficulty hearing and describes a faint buzzing in his ear. He saw an audiologist two weeks ago and had a repeat evaluation today.   PAST MEDICAL HISTORY:    Past Medical History:    Acute pharyngitis    Allergic rhinitis    Calculus of kidney    Cardiomyopathy (HCC)    Chronic sinusitis    Hx of motion sickness    Lipid screening    Seasonal allergies    Visual impairment       PAST SURGICAL HISTORY:    Past Surgical History:   Procedure Laterality Date    Colonoscopy screening - referral N/A 8/24/2022    Procedure: COLONOSCOPY-SCREENING;  Surgeon: Gail Clemente MD;  Location: [facility] ENDOSCOPY    Other surgical history  2004    Chronic sinusitis, sinus surgery     Vasectomy  2011       Medications Ordered Prior to Encounter[1]    Allergies: Allergies[2]    SOCIAL HISTORY:    Social History     Tobacco Use    Smoking status: Some Days     Types: Cigars    Smokeless tobacco: Never    Tobacco comments:     3-6 cigars per year   Substance Use Topics    Alcohol use: Yes     Comment: weekly       Family History   Problem Relation Age of Onset    Diabetes Mother     Cancer Maternal Grandfather     Lung Disorder Paternal Grandfather         Emphysema       REVIEW OF SYSTEMS:   Positives are in bold  Neuro: Headache, facial weakness, facial numbness, neck pain, vertigo  ENT: Hearing change, tinnitus, otorrhea, otalgia, aural fullness, ear pressure, vertigo, imbalance  Sinus pressure, rhinorrhea, congestion, facial pain, jaw pain, dysphagia, odynophagia, sore throat, voice changes, shortness of  breath    EXAMINATION:  I washed my hands with an alcohol-based hand gel prior to examination  Constitutional:   --Vitals: Height 5' 9\" (1.753 m), weight 170 lb (77.1 kg).  General: no apparent distress, well-developed, conversant  Respiratory: No stridor, stertor or increased work of breathing  ENT:  --Nose: no external nasal deformity, anterior rhinoscopy: Septum midline, no inferior turbinate hypertrophy, mucosa healthy, no rhinorrhea  --OC/OP: No trismus. No masses or lesions noted over the gingiva, buccal mucosa, tongue, FOM, hard/soft palate, tonsillar pillars, posterior pharyngeal wall. FOM/BOT are soft.   --Neck: No palpable cervical lymphadenopathy, no thyromegaly, no masses or lesions over the bilateral submandibular or parotid glands  --Ear: (bilateral ears were examined under binocular microscopy)  Right ear microscopic exam:  Pinna: Normal, no lesions or masses.  Mastoid: Nontender on palpation.   External auditory canal: Clear, no masses or lesions.  Tympanic membrane: Intact, no lesions, normal landmarks.  Middle ear: Aerated.    Left ear microscopic exam:  Pinna: Normal, no lesions or masses.  Mastoid: Nontender on palpation.   External auditory canal: Clear, no masses or lesions.  Tympanic membrane: Intact, no lesions, normal landmarks.  Middle ear: Aerated.     Latest Audiogram Result (Hz) Exam performed: 10/11/2024 11:07 AM Last edited by Irina Lewis, AUD on 10/11/2024 11:14 AM        125 250  1500 2000 3000 4000 6000 8000    Right air:  20 15  10  10  5 10 20    Left air:  15 15  5  5  5 20 40       Reliability:  Good    Transducer:  Inserts    Technique:  Conventional Audiometry    Comments:            Latest Speech Audiometry  Last edited by Irina Lewis, AUD on 10/11/2024 11:14 AM       Ear Method PTA SAT SRT [facility] Test/list Score (%) Intensity Mask/noise Notes    right live voice   15   10 By Difficulty 100 55      left live voice   10   10 By Difficulty 100 55                     Latest Audiogram Result Text  Last edited by Irina Lewis, AUD on 10/11/2024 12:21 PM      Addendum      HISTORY:    Romeo Pendleton, age 49 year old, was seen for hearing assessment as requested by YAIMA VARGAS MD.   Mr. Pendleton reported familial history of hearing loss.  He denied any otalgia, drainage, ear infection history, aural fullness, pressure, popping sensations, tinnitus, vertigo, and history of excessive noise exposure.      RESULTS:    Otoscopics revealed patent canals and healthy appearing tympanic membranes, bilaterally.      Pure tone air conduction thresholds were consistent with normal hearing sensitivity in the right ear and normal hearing sensitivity through 6000 Hz, sloping to mild hearing loss in the left ear.     SRT:  Right: 15 dB HL            Left :  10 dB HL    WRS:  Right: 100% at 55 dB HL             Left: 100% at 55 dB HL    RECOMMENDATIONS:  1.  Follow-up with primary care physician as needed.   2.  Otolaryngology evaluation of asymmetric sensorineural hearing loss to rule out retrocochlear involvement.   3.  Use of assistive listening strategies (i.e. use of visual cues, reduction of distance and background noise, positioning in environment, etc.) to minimize communication difficulties       Addended by Irina Lewis, AUD on 10/11/2024 12:21 PM               ASSESSMENT/PLAN:  Romeo Pendleton is a 49 year old male with     ICD-10-CM   1. Sensorineural hearing loss (SNHL) of left ear with unrestricted hearing of right ear  H90.42   2. Tinnitus of left ear  H93.12        IMPRESSION:  Minimally asymmetric SNHL, only at 8k in left ear otherwise normal hearing    PLAN:  -Marginal asymmetric sensorineural hearing loss noted. Criteria of 15 dB difference in two pure tone frequencies and/or 15% difference in speech discrimination scores between ears not met. Recommend repeat audiogram in 1 year.     Situation reviewed with the patient in  detail.    Attention: This note has been scribed by Sylvia Espinosa under the supervision of Devon Rodarte MD.     Devon Rodarte MD  Otology/Otolaryngology  [address] [place] [address]  Phone [phone]  Fax [phone]      I have personally performed the services described in this documentation. All medical record entries made by the scribe were at my direction and in my presence. I have reviewed the chart and agree that the medical record reflects my personal performance and is accurate and complete.             [1]   Current Outpatient Medications on File Prior to Visit   Medication Sig Dispense Refill    loratadine-pseudoephedrine ER (LORATADINE-D 24HR)  MG Oral Tablet 24 Hr Take 1 tablet by mouth daily. 90 tablet 0    Omeprazole 40 MG Oral Capsule Delayed Release TAKE 1 CAPSULE BY MOUTH EVERY DAY BEFORE A MEAL 90 capsule 3     No current facility-administered medications on file prior to visit.   [2]   Allergies  Allergen Reactions    Mildew ITCHING    Mold ITCHING    Pollen ITCHING

## 2024-11-15 DIAGNOSIS — T78.40XD ALLERGY, SUBSEQUENT ENCOUNTER: ICD-10-CM

## 2024-11-20 RX ORDER — LORATADINE/PSEUDOEPHEDRINE 10MG-240MG
1 TABLET, EXTENDED RELEASE 24 HR ORAL DAILY
Qty: 90 TABLET | Refills: 0 | Status: SHIPPED | OUTPATIENT
Start: 2024-11-20

## 2024-11-20 NOTE — ED INITIAL ASSESSMENT (HPI)
Discharge facility[facility]  Discharge diagnosis: Wound infection   Issues Requiring follow up  Chronic wound, cont wound care. Follow up with wound clinic     Admission date: 11/16/24  Discharge date: 11/19/24    PCP Appointment Date: No available appointments within 14 days/ message to office , patient would prefer virtual if possible  Specialist Appointment Date: 11/25/24 Wound care center  Hospital Encounter and Summary: Linked    See Discharge assessment below for further details    Medications  Medications reviewed with patient/caregiver?: Yes (11/20/2024  9:20 AM)  Is the patient having any side effects they believe may be caused by any medication additions or changes?: No (11/20/2024  9:20 AM)  Does the patient have all medications ordered at discharge?: Yes (11/20/2024  9:20 AM)  Care Management Interventions: Provided patient education (11/20/2024  9:20 AM)  Prescription Comments: [facility] to [facility] for[facility]oxacin (11/20/2024  9:20 AM)  Is the patient taking all medications as directed (includes completed medication regime)?: Yes (11/20/2024  9:20 AM)  Care Management Interventions: Provided patient education (11/20/2024  9:20 AM)  Medication Comments: Instructed new medicationciprofloxacin 500 mg tablet  Commonly known as: Cipro  Take 1 tablet (500 mg) by mouth 2 times a day  for 14 days. (11/20/2024  9:20 AM)  Follow Up Tasks: -- (n/a) (11/20/2024  9:20 AM)    Appointments  Does the patient have a primary care provider?: Yes (11/20/2024  9:20 AM)  Care Management Interventions: Educated patient on importance of making appointment (Message to office) (11/20/2024  9:20 AM)  Has the patient kept scheduled appointments due by today?: No (11/20/2024  9:20 AM)  What is preventing the patient from keeping their appointments?: -- (Several noted wound care appts canceled.) (11/20/2024  9:20 AM)  Care Management Interventions: Educated on importance of keeping  Pt reports right flank pain that started this morning. Pt reports 10/10 pain. Pt reports no fever. Pt reports nausea and vomiting. Pt states he may have a kidney stone.    appointment; Advised patient to keep appointment (11/20/2024  9:20 AM)  Follow Up Tasks: Transportation (11/20/2024  9:20 AM)    Self Management  What is the home health agency?:  Home Care (11/20/2024  9:20 AM)  Has home health visited the patient within 72 hours of discharge?: Call prior to 72 hours (11/20/2024  9:20 AM)  Home Health Interventions: -- (n/a) (11/20/2024  9:20 AM)  What Durable Medical Equipment (DME) was ordered?: n/a (11/20/2024  9:20 AM)  Has all Durable Medical Equipment (DME) been delivered?: -- (n/a) (11/20/2024  9:20 AM)  DME Interventions: -- (n/a) (11/20/2024  9:20 AM)  Care Management Interventions: Notified PCP/provider (11/20/2024  9:20 AM)  Follow Up Tasks: -- (n/a) (11/20/2024  9:20 AM)    Patient Teaching  Does the patient have access to their discharge instructions?: Yes (11/20/2024  9:20 AM)  Care Management Interventions: Reviewed instructions with patient (11/20/2024  9:20 AM)  What is the patient's perception of their health status since discharge?: Improving (11/20/2024  9:20 AM)  Is the patient/caregiver able to teach back the hierarchy of who to call/visit for symptoms/problems? PCP, Specialist, Home Health nurse, Urgent Care, ED, 911: Yes (11/20/2024  9:20 AM)  Patient/Caregiver Education Comments: Instructed on hospital discharge instructions. Instructed on new and changed medications. Instructed if increased shortness of breath or chest pains to call 911. Provided my contact information and encouraged to call with any questions (11/20/2024  9:20 AM)    Wrap Up  Wrap Up Additional Comments: Patient reports doing all right. She states she has everything she needs at this time. She reports home care nurse is suppose to be coming out and will help her with wound care. She does have appointment on Monday scheduled with wound care center. She is taking all medications as ordered. Denies any questions or concerns at this time. She would like to be able to do virtual follow up  with PCP due to having to make transportation arrangements. (11/20/2024  9:20 AM)

## 2024-11-20 NOTE — TELEPHONE ENCOUNTER
Please review; protocol failed/No Protocol    Recent Fills: 08/04/2024- quantity 90 for 90 day supply     Last Rx Written: 08/04/2024    Last Office Visit: 01/17/2024  Future Appointments   Date Time Provider Department Center   12/4/2024  9:00 AM Lana Mcadams MD [id number] [place] [address]     Requested Prescriptions   Pending Prescriptions Disp Refills    LORATADINE-D 24HR  MG Oral Tablet 24 Hr [Pharmacy Med Name: LORATADINE-D 24 HR TABLETS] 90 tablet 0     Sig: TAKE 1 TABLET BY MOUTH EVERY DAY       There is no refill protocol information for this order        Future Appointments         Provider Department Appt Notes    In 2 weeks Lana Mcadams MD [facility] Renew prescriptions for claritin and omeprezale. Check blood pressurw l    In 2 months Gosia Walters MD [facility] yearly body ck          Recent Outpatient Visits              2 weeks ago Other specified hearing loss of left ear, unspecified hearing status on contralateral side    [facility] Charley Jorgensen MS, CCC-A    Office Visit    2 weeks ago Sensorineural hearing loss (SNHL) of left ear with unrestricted hearing of right ear    [facility] Devon Blanca MD    Office Visit    1 month ago Sensorineural hearing loss (SNHL) of left ear with unrestricted hearing of right ear    [facility] Irina Lewis, YUN    Office Visit    10 months ago Right ureteral stone    [facility] Lana Mcadams MD    Office Visit    10 months ago Right ureteral stone    [facility] Vicente Han MD    Office Visit

## 2024-12-04 ENCOUNTER — OFFICE VISIT (OUTPATIENT)
Dept: INTERNAL MEDICINE CLINIC | Facility: CLINIC | Age: 49
End: 2024-12-04
Payer: COMMERCIAL

## 2024-12-04 VITALS
WEIGHT: 184 LBS | HEART RATE: 84 BPM | DIASTOLIC BLOOD PRESSURE: 89 MMHG | OXYGEN SATURATION: 96 % | SYSTOLIC BLOOD PRESSURE: 139 MMHG | HEIGHT: 69 IN | TEMPERATURE: 98 F | BODY MASS INDEX: 27.25 KG/M2

## 2024-12-04 DIAGNOSIS — E53.8 B12 DEFICIENCY: ICD-10-CM

## 2024-12-04 DIAGNOSIS — E55.9 VITAMIN D DEFICIENCY: ICD-10-CM

## 2024-12-04 DIAGNOSIS — T78.40XD ALLERGY, SUBSEQUENT ENCOUNTER: ICD-10-CM

## 2024-12-04 DIAGNOSIS — Z00.00 ANNUAL PHYSICAL EXAM: Primary | ICD-10-CM

## 2024-12-04 PROCEDURE — 3008F BODY MASS INDEX DOCD: CPT | Performed by: INTERNAL MEDICINE

## 2024-12-04 PROCEDURE — 99396 PREV VISIT EST AGE 40-64: CPT | Performed by: INTERNAL MEDICINE

## 2024-12-04 PROCEDURE — 3075F SYST BP GE 130 - 139MM HG: CPT | Performed by: INTERNAL MEDICINE

## 2024-12-04 PROCEDURE — 3079F DIAST BP 80-89 MM HG: CPT | Performed by: INTERNAL MEDICINE

## 2024-12-04 RX ORDER — OMEPRAZOLE 40 MG/1
CAPSULE, DELAYED RELEASE ORAL
Qty: 90 CAPSULE | Refills: 3 | Status: SHIPPED | OUTPATIENT
Start: 2024-12-04

## 2024-12-04 RX ORDER — LORATADINE/PSEUDOEPHEDRINE 10MG-240MG
1 TABLET, EXTENDED RELEASE 24 HR ORAL DAILY
Qty: 90 TABLET | Refills: 0 | Status: CANCELLED | OUTPATIENT
Start: 2024-12-04

## 2024-12-04 RX ORDER — LORATADINE/PSEUDOEPHEDRINE 10MG-240MG
1 TABLET, EXTENDED RELEASE 24 HR ORAL DAILY
Qty: 90 TABLET | Refills: 0 | Status: SHIPPED | OUTPATIENT
Start: 2024-12-04

## 2024-12-04 NOTE — PROGRESS NOTES
Romeo Pendleton is a 49 year old male who presents for a complete physical exam.   HPI:   He has no complains    Wt Readings from Last 3 Encounters:   12/04/24 184 lb (83.5 kg)   11/01/24 170 lb (77.1 kg)   01/23/24 170 lb (77.1 kg)     Body mass index is 27.17 kg/m².     Cholesterol, Total (mg/dL)   Date Value   11/27/2023 209 (H)   07/21/2022 181   07/27/2021 192     HDL Cholesterol (mg/dL)   Date Value   11/27/2023 47   07/21/2022 48   07/27/2021 51     LDL Cholesterol (mg/dL)   Date Value   11/27/2023 130 (H)   07/21/2022 117 (H)   07/27/2021 125 (H)     AST (U/L)   Date Value   11/27/2023 19   07/21/2022 13 (L)   07/27/2021 18     ALT (U/L)   Date Value   11/27/2023 19   07/21/2022 24   07/27/2021 29      Current Outpatient Medications   Medication Sig Dispense Refill    loratadine-pseudoephedrine ER (LORATADINE-D 24HR)  MG Oral Tablet 24 Hr Take 1 tablet by mouth daily. 90 tablet 0    Omeprazole 40 MG Oral Capsule Delayed Release TAKE 1 CAPSULE BY MOUTH EVERY DAY BEFORE A MEAL 90 capsule 3      Past Medical History:    Acute pharyngitis    Allergic rhinitis    Calculus of kidney    Cardiomyopathy (HCC)    Chronic sinusitis    Hx of motion sickness    Lipid screening    Seasonal allergies    Visual impairment      Past Surgical History:   Procedure Laterality Date    Colonoscopy screening - referral N/A 08/24/2022    Procedure: COLONOSCOPY-SCREENING;  Surgeon: Gail Clemente MD;  Location: [facility] ENDOSCOPY    Other surgical history  2004    Chronic sinusitis, sinus surgery     Tonsillectomy  01/01/1985    Likely ‘84-‘85    Vasectomy  2011      Family History   Problem Relation Age of Onset    Diabetes Mother     Cancer Maternal Grandfather     Lung Disorder Paternal Grandfather         Emphysema    Cancer Paternal Grandfather         Heart attack    Cancer Maternal Grandmother         Parkinsons    Cancer Paternal Grandmother         Cancer      Social History:  Social History     Socioeconomic History     Marital status:    Tobacco Use    Smoking status: Light Smoker     Types: Cigars    Smokeless tobacco: Never    Tobacco comments:     I smoke 6-8 cigars per summer when i golf   Vaping Use    Vaping status: Never Used   Substance and Sexual Activity    Alcohol use: Yes     Alcohol/week: 5.0 standard drinks of alcohol     Types: 1 Glasses of wine, 2 Cans of beer, 2 Standard drinks or equivalent per week     Comment: weekly    Drug use: Never   Other Topics Concern    Caffeine Concern Yes     Comment: Weekly; a couple, soda     Pt has a pacemaker No    Pt has a defibrillator No    Reaction to local anesthetic No     Social Drivers of Health     Food Insecurity: No Food Insecurity (12/4/2024)    NCSS - Food Insecurity     Worried About Running Out of Food in the Last Year: No     Ran Out of Food in the Last Year: No   Transportation Needs: No Transportation Needs (12/4/2024)    NCSS - Transportation     Lack of Transportation: No   Housing Stability: Not At Risk (12/4/2024)    NCSS - Housing/Utilities     Has Housing: Yes     Worried About Losing Housing: No     Unable to Get Utilities: No      Exercise: three times per week.  Diet: watches calories closely     REVIEW OF SYSTEMS:     Constitutional Negative Chills, fatigue and fever.   ENMT Negative Hearing loss, nasal drainage, pain in/around ear, sinus pressure and sore throat.   Eyes Negative Eye pain and vision changes.   Respiratory Negative Cough, dyspnea and wheezing.   Cardio Negative Chest pain, claudication, edema and irregular heartbeat/palpitations.   GI Negative Abdominal pain, blood in stool, constipation, diarrhea, heartburn, nausea and vomiting.    Negative Dysuria, hematuria, nocturia   Endocrine Negative Cold intolerance and heat intolerance.   Neuro Negative Dizziness, extremity weakness, headache, memory impairment, numbness in extremities, seizures and tremors.   Psych Negative Anxiety, depression and insomnia.   Integumentary Negative  Rash, changing skin lesions   MS Negative Back pain and joint pain.   Hema/Lymph Negative Easy bleeding, easy bruising and thromboembolic events.   Allergic/Immuno Negative Environmental allergies, food allergies and seasonal allergies.       EXAM:   /89 (BP Location: Right arm, Patient Position: Sitting, Cuff Size: adult)   Pulse 84   Temp 97.6 °F (36.4 °C) (Temporal)   Ht 5' 9\" (1.753 m)   Wt 184 lb (83.5 kg)   SpO2 96%   BMI 27.17 kg/m²   Body mass index is 27.17 kg/m².   Constitutional Normal Well developed.   Eyes Normal Pupil - Right: Normal, Left: Normal.   Ears Normal Inspection - Right: Normal, Left: Normal. Canal - Right: Normal, Left: Normal. TM - Right: Normal, Left: Normal. Hearing - Right: Normal, Left: Normal.   Nasopharynx Normal External nose - Normal. Lips/teeth/gums - Normal. Oropharynx - Normal.   Neck Exam Normal Palpation - Normal. No thyromegaly   Respiratory Normal Auscultation - Normal, no wheezes or rales   Cardiovascular Normal Regular rate and rhythm. No murmurs, gallops, or rubs   Vascular Normal Pulses - Dorsalis pedis: Normal. Bruits - Carotids: Absent.    Extremity Normal No edema. No varicosities.   Abdomen Normal Inspection - Normal. Auscultation - Normal. No abdominal tenderness.   Musculoskeletal Normal Overview - Normal.   Skin Normal Inspection - Normal.   Neurological Normal Memory - Normal. Sensory - Normal. Motor - Normal. Balance & gait - Normal.   Psychiatric Normal Orientation - Oriented to time, place, person & situation. Appropriate mood and affect.       ASSESSMENT AND PLAN:   Romeo Pendleton is a 49 year old male who presents for a complete physical exam. Pt's weight is Body mass index is 27.17 kg/m²., recommended low fat diet and aerobic exercise 30 minutes three times weekly.   Health maintenance: patient is due for  blood work   Allergies - refill his medications   Refusing flu shot   Discussed regular exercise, low fat diet, The patient indicates  understanding of these issues and agrees to the plan.  The patient is asked to return for annual physical in 1 year.

## 2024-12-06 ENCOUNTER — LAB ENCOUNTER (OUTPATIENT)
Dept: LAB | Age: 49
End: 2024-12-06
Attending: INTERNAL MEDICINE
Payer: COMMERCIAL

## 2024-12-06 DIAGNOSIS — E53.8 B12 DEFICIENCY: ICD-10-CM

## 2024-12-06 DIAGNOSIS — Z00.00 ANNUAL PHYSICAL EXAM: ICD-10-CM

## 2024-12-06 DIAGNOSIS — E55.9 VITAMIN D DEFICIENCY: ICD-10-CM

## 2024-12-06 LAB
ALBUMIN SERPL-MCNC: 4.5 G/DL (ref 3.2–4.8)
ALBUMIN/GLOB SERPL: 1.9 {RATIO} (ref 1–2)
ALP LIVER SERPL-CCNC: 56 U/L
ALT SERPL-CCNC: 21 U/L
ANION GAP SERPL CALC-SCNC: 7 MMOL/L (ref 0–18)
AST SERPL-CCNC: 21 U/L (ref ?–34)
BASOPHILS # BLD AUTO: 0.06 X10(3) UL (ref 0–0.2)
BASOPHILS NFR BLD AUTO: 0.8 %
BILIRUB SERPL-MCNC: 0.5 MG/DL (ref 0.3–1.2)
BUN BLD-MCNC: 15 MG/DL (ref 9–23)
BUN/CREAT SERPL: 16.9 (ref 10–20)
CALCIUM BLD-MCNC: 9.2 MG/DL (ref 8.7–10.4)
CHLORIDE SERPL-SCNC: 106 MMOL/L (ref 98–112)
CHOLEST SERPL-MCNC: 199 MG/DL (ref ?–200)
CO2 SERPL-SCNC: 26 MMOL/L (ref 21–32)
COMPLEXED PSA SERPL-MCNC: 0.94 NG/ML (ref ?–4)
CREAT BLD-MCNC: 0.89 MG/DL
DEPRECATED RDW RBC AUTO: 37.6 FL (ref 35.1–46.3)
EGFRCR SERPLBLD CKD-EPI 2021: 104 ML/MIN/1.73M2 (ref 60–?)
EOSINOPHIL # BLD AUTO: 0.2 X10(3) UL (ref 0–0.7)
EOSINOPHIL NFR BLD AUTO: 2.8 %
ERYTHROCYTE [DISTWIDTH] IN BLOOD BY AUTOMATED COUNT: 12.7 % (ref 11–15)
EST. AVERAGE GLUCOSE BLD GHB EST-MCNC: 117 MG/DL (ref 68–126)
FASTING PATIENT LIPID ANSWER: YES
FASTING STATUS PATIENT QL REPORTED: YES
GLOBULIN PLAS-MCNC: 2.4 G/DL (ref 2–3.5)
GLUCOSE BLD-MCNC: 100 MG/DL (ref 70–99)
HBA1C MFR BLD: 5.7 % (ref ?–5.7)
HCT VFR BLD AUTO: 44.8 %
HDLC SERPL-MCNC: 42 MG/DL (ref 40–59)
HGB BLD-MCNC: 15.3 G/DL
IMM GRANULOCYTES # BLD AUTO: 0.03 X10(3) UL (ref 0–1)
IMM GRANULOCYTES NFR BLD: 0.4 %
LDLC SERPL CALC-MCNC: 140 MG/DL (ref ?–100)
LYMPHOCYTES # BLD AUTO: 2.29 X10(3) UL (ref 1–4)
LYMPHOCYTES NFR BLD AUTO: 32.3 %
MCH RBC QN AUTO: 27.8 PG (ref 26–34)
MCHC RBC AUTO-ENTMCNC: 34.2 G/DL (ref 31–37)
MCV RBC AUTO: 81.5 FL
MONOCYTES # BLD AUTO: 0.59 X10(3) UL (ref 0.1–1)
MONOCYTES NFR BLD AUTO: 8.3 %
NEUTROPHILS # BLD AUTO: 3.93 X10 (3) UL (ref 1.5–7.7)
NEUTROPHILS # BLD AUTO: 3.93 X10(3) UL (ref 1.5–7.7)
NEUTROPHILS NFR BLD AUTO: 55.4 %
NONHDLC SERPL-MCNC: 157 MG/DL (ref ?–130)
OSMOLALITY SERPL CALC.SUM OF ELEC: 289 MOSM/KG (ref 275–295)
PLATELET # BLD AUTO: 272 10(3)UL (ref 150–450)
POTASSIUM SERPL-SCNC: 4 MMOL/L (ref 3.5–5.1)
PROT SERPL-MCNC: 6.9 G/DL (ref 5.7–8.2)
RBC # BLD AUTO: 5.5 X10(6)UL
SODIUM SERPL-SCNC: 139 MMOL/L (ref 136–145)
TRIGL SERPL-MCNC: 93 MG/DL (ref 30–149)
TSI SER-ACNC: 1.46 UIU/ML (ref 0.55–4.78)
VIT B12 SERPL-MCNC: 501 PG/ML (ref 211–911)
VIT D+METAB SERPL-MCNC: 38.2 NG/ML (ref 30–100)
VLDLC SERPL CALC-MCNC: 17 MG/DL (ref 0–30)
WBC # BLD AUTO: 7.1 X10(3) UL (ref 4–11)

## 2024-12-06 PROCEDURE — 82607 VITAMIN B-12: CPT

## 2024-12-06 PROCEDURE — 82306 VITAMIN D 25 HYDROXY: CPT

## 2024-12-06 PROCEDURE — 84443 ASSAY THYROID STIM HORMONE: CPT

## 2024-12-06 PROCEDURE — 83036 HEMOGLOBIN GLYCOSYLATED A1C: CPT

## 2024-12-06 PROCEDURE — 85025 COMPLETE CBC W/AUTO DIFF WBC: CPT

## 2024-12-06 PROCEDURE — 80061 LIPID PANEL: CPT

## 2024-12-06 PROCEDURE — 80053 COMPREHEN METABOLIC PANEL: CPT

## 2024-12-06 PROCEDURE — 36415 COLL VENOUS BLD VENIPUNCTURE: CPT

## 2024-12-07 ENCOUNTER — PATIENT MESSAGE (OUTPATIENT)
Dept: INTERNAL MEDICINE CLINIC | Facility: CLINIC | Age: 49
End: 2024-12-07

## 2025-01-27 ENCOUNTER — OFFICE VISIT (OUTPATIENT)
Dept: DERMATOLOGY CLINIC | Facility: CLINIC | Age: 50
End: 2025-01-27
Payer: COMMERCIAL

## 2025-01-27 DIAGNOSIS — D22.9 MULTIPLE NEVI: Primary | ICD-10-CM

## 2025-01-27 DIAGNOSIS — L81.4 LENTIGO: ICD-10-CM

## 2025-01-27 DIAGNOSIS — D23.9 BENIGN NEOPLASM OF SKIN, UNSPECIFIED LOCATION: ICD-10-CM

## 2025-01-27 DIAGNOSIS — Z12.83 SKIN CANCER SCREENING: ICD-10-CM

## 2025-01-27 DIAGNOSIS — L82.1 SK (SEBORRHEIC KERATOSIS): ICD-10-CM

## 2025-01-27 PROCEDURE — 99214 OFFICE O/P EST MOD 30 MIN: CPT | Performed by: DERMATOLOGY

## 2025-01-28 NOTE — PROGRESS NOTES
The following individual(s) verbally consented to be recorded using ambient AI listening technology and understand that they can each withdraw their consent to this listening technology at any point by asking the clinician to turn off or pause the recording: Romeo Pendleton

## 2025-02-02 NOTE — PROGRESS NOTES
Romeo Pendleton is a 49 year old male.    CC:    Chief Complaint   Patient presents with    Full Skin Exam     LOV 04/22/23- Pt presents for a Full Body Skin Exam. Pt denies any new lesions or areas of concern.  Pt denies a personal Hx of skin ca. Family Hx of Melanoma (Mother).          HISTORY:    Past Medical History:    Acute pharyngitis    Allergic rhinitis    Calculus of kidney    Cardiomyopathy (HCC)    Chronic sinusitis    Hx of motion sickness    Lipid screening    Seasonal allergies    Visual impairment      Past Surgical History:   Procedure Laterality Date    Colonoscopy screening - referral N/A 08/24/2022    Procedure: COLONOSCOPY-SCREENING;  Surgeon: Gail Clemente MD;  Location: [facility] ENDOSCOPY    Other surgical history  2004    Chronic sinusitis, sinus surgery     Tonsillectomy  01/01/1985    Likely ‘84-‘85    Vasectomy  2011      Family History   Problem Relation Age of Onset    Diabetes Mother     Cancer Maternal Grandfather     Lung Disorder Paternal Grandfather         Emphysema    Cancer Paternal Grandfather         Heart attack    Cancer Maternal Grandmother         Parkinsons    Cancer Paternal Grandmother         Cancer      Social History     Socioeconomic History    Marital status:    Tobacco Use    Smoking status: Light Smoker     Types: Cigars    Smokeless tobacco: Never    Tobacco comments:     I smoke 6-8 cigars per summer when i golf   Vaping Use    Vaping status: Never Used   Substance and Sexual Activity    Alcohol use: Yes     Alcohol/week: 5.0 standard drinks of alcohol     Types: 1 Glasses of wine, 2 Cans of beer, 2 Standard drinks or equivalent per week     Comment: weekly    Drug use: Never   Other Topics Concern    Caffeine Concern Yes     Comment: Weekly; a couple, soda     Pt has a pacemaker No    Pt has a defibrillator No    Reaction to local anesthetic No     Social Drivers of Health     Food Insecurity: No Food Insecurity (12/4/2024)    NCSS - Food Insecurity      Worried About Running Out of Food in the Last Year: No     Ran Out of Food in the Last Year: No   Transportation Needs: No Transportation Needs (12/4/2024)    NCSS - Transportation     Lack of Transportation: No   Housing Stability: Not At Risk (12/4/2024)    NCSS - Housing/Utilities     Has Housing: Yes     Worried About Losing Housing: No     Unable to Get Utilities: No        Current Outpatient Medications   Medication Sig Dispense Refill    Omeprazole 40 MG Oral Capsule Delayed Release TAKE 1 CAPSULE BY MOUTH EVERY DAY BEFORE A MEAL 90 capsule 3    loratadine-pseudoephedrine ER (LORATADINE-D 24HR)  MG Oral Tablet 24 Hr Take 1 tablet by mouth daily. 90 tablet 0     Allergies:   Allergies[1]    Past Medical History:    Acute pharyngitis    Allergic rhinitis    Calculus of kidney    Cardiomyopathy (HCC)    Chronic sinusitis    Hx of motion sickness    Lipid screening    Seasonal allergies    Visual impairment     Past Surgical History:   Procedure Laterality Date    Colonoscopy screening - referral N/A 08/24/2022    Procedure: COLONOSCOPY-SCREENING;  Surgeon: Gail Clemente MD;  Location: [facility] ENDOSCOPY    Other surgical history  2004    Chronic sinusitis, sinus surgery     Tonsillectomy  01/01/1985    Likely ‘84-‘85    Vasectomy  2011     Social History     Socioeconomic History    Marital status:      Spouse name: Not on file    Number of children: Not on file    Years of education: Not on file    Highest education level: Not on file   Occupational History    Not on file   Tobacco Use    Smoking status: Light Smoker     Types: Cigars    Smokeless tobacco: Never    Tobacco comments:     I smoke 6-8 cigars per summer when i golf   Vaping Use    Vaping status: Never Used   Substance and Sexual Activity    Alcohol use: Yes     Alcohol/week: 5.0 standard drinks of alcohol     Types: 1 Glasses of wine, 2 Cans of beer, 2 Standard drinks or equivalent per week     Comment: weekly    Drug use: Never    Sexual  activity: Not on file   Other Topics Concern     Service Not Asked    Blood Transfusions Not Asked    Caffeine Concern Yes     Comment: Weekly; a couple, soda     Occupational Exposure Not Asked    Hobby Hazards Not Asked    Sleep Concern Not Asked    Stress Concern Not Asked    Weight Concern Not Asked    Special Diet Not Asked    Back Care Not Asked    Exercise Not Asked    Bike Helmet Not Asked    Seat Belt Not Asked    Self-Exams Not Asked    Grew up on a farm Not Asked    History of tanning Not Asked    Outdoor occupation Not Asked    Pt has a pacemaker No    Pt has a defibrillator No    Reaction to local anesthetic No   Social History Narrative    Not on file     Social Drivers of Health     Financial Resource Strain: Not on file   Food Insecurity: No Food Insecurity (12/4/2024)    NCSS - Food Insecurity     Worried About Running Out of Food in the Last Year: No     Ran Out of Food in the Last Year: No   Transportation Needs: No Transportation Needs (12/4/2024)    NCSS - Transportation     Lack of Transportation: No   Physical Activity: Not on file   Stress: Not on file   Social Connections: Not on file   Housing Stability: Not At Risk (12/4/2024)    NCSS - Housing/Utilities     Has Housing: Yes     Worried About Losing Housing: No     Unable to Get Utilities: No     Family History   Problem Relation Age of Onset    Diabetes Mother     Cancer Maternal Grandfather     Lung Disorder Paternal Grandfather         Emphysema    Cancer Paternal Grandfather         Heart attack    Cancer Maternal Grandmother         Parkinsons    Cancer Paternal Grandmother         Cancer       HPI:     HPI:  Chief Complaint   Patient presents with    Full Skin Exam     LOV 04/22/23- Pt presents for a Full Body Skin Exam. Pt denies any new lesions or areas of concern.  Pt denies a personal Hx of skin ca. Family Hx of Melanoma (Mother).      Follow-up patient with multiple nevi, family history melanoma  History of Present  Illness  The patient, with a family history of melanoma, presents for a dermatological examination. He reports no new or concerning symptoms. The patient's mother was recently diagnosed with melanoma, but it was caught early and she is currently doing well. The patient acknowledges the need for increased vigilance due to the genetic component of melanoma.    On examination, the patient has noted an increase in age spots, which he describes as 'brown crusty scabies.' These spots are not dangerous but can be annoying due to his tendency to stick up and peel. The patient has also noticed some sun freckles on the back of his ears, a place he often forgets to apply sunscreen. He acknowledges the need to be more careful with sun protection, especially around the ears.    The patient reports overall good health and has been staying out of the hospital. He has a child in college and is keeping busy.    Patient presents with concerns above.    Patient has been in their usual state of health.     Past notes/ records and appropriate/relevant lab results including pathology and past body maps reviewed. Including outside notes/ PCP notes as appropriate. Updated and new information noted in current visit.     ROS:  Denies other relevant systemic complaints. See HPI.     History, medications, allergies reviewed as noted.    Allergies:  Mildew, Mold, and Pollen      There were no vitals filed for this visit.    Physical Examination:     Well-developed well-nourished patient alert oriented in no acute distress.  Exam performed of appropriate involved areas    Physical Exam  SKIN: Scalp without abnormalities. Sun freckles on back of ears, left side less affected. Brown, crusty, scaly lesions present, described as age spots.    Multiple light to medium brown, well marginated, uniformly pigmented, macules and papules 6 mm and less scattered on exam. pigmented lesions examined with dermoscopy benign-appearing patterns.     Waxy tannish  keratotic papules scattered, cherry-red vascular papules scattered.    See map today's date for lesions noted .  See assessment and plan below for specific lesions.    Otherwise remarkable for lesions as noted on map.    See A/P  below for additional information:    Assessment / plan:    Results      No orders of the defined types were placed in this encounter.      Meds & Refills for this Visit:  Requested Prescriptions      No prescriptions requested or ordered in this encounter         Encounter Diagnoses   Name Primary?    Multiple nevi Yes    Lentigo     SK (seborrheic keratosis)     Benign neoplasm of skin, unspecified location     Skin cancer screening        Assessment & Plan  Melanoma Screening  Family history of melanoma (mother). No current signs observed. Emphasized regular skin checks and sun protection. Discussed genetic risk and importance of early detection.  - Continue regular skin checks  - Emphasize sun protection, including use of hats and sunscreen on all exposed areas, especially ears    Solar Lentigines  Sun freckles on the back of the ears. Emphasized sun protection, especially in neglected areas. Discussed regular sunscreen application to prevent further damage.  - Monitor for changes  - Emphasize sun protection, including sunscreen application to the ears    Seborrheic Keratosis  Brown, crusty, scaly lesions present. Benign but can be bothersome. Discussed commonality and need for monitoring.  - Monitor for changes  - Educate on benign nature of seborrheic keratosis    General Health Maintenance  Overall good health. No recent hospitalizations.  - Continue regular health check-ups  - Maintain a healthy lifestyle.        Patient seen for follow-up long-term monitoring, treatment of  Multiple nevi, family history melanoma  Plan of care:  ongoing surveillance, monitoring including regular follow-up due to longer term risk of recurrence, new lesions.  See previous notes.  There is a  longitudinal care relationship with me, the care plan reflects the ongoing nature of the continuous relationship of care, and the medical record indicates that there is ongoing treatment of a serious/complex medical condition which I am currently managing.  is Applicable                  Benign-appearing nevi, no atypical features on dermoscopy reassurance given monitor.     Waxy tan keratotic papules lesions in areas of concern as noted reassurance given.  Benign nature discussed.  Possibility of cryo, alphahydroxy acids over-the-counter retinol's discussed.     No other susupicious lesions on todays  exam.    Please refer to map for specific lesions.  See additional diagnoses.  Pros cons of various therapies, risks benefits discussed.Pathophysiology, terapeutic options reviewed.  See  Medications in grid.  Instructions reviewed at length.    Benign nevi, seborrheic  keratoses, cherry angiomas:  Reassurance regarding other benign skin lesions.    Monitor for new or changing lesions. Signs and symptoms of skin cancer, ABCDE's of melanoma ( additional information available at AAD.org, skincancer.org) Encourage Sunscreen (broad-spectrum, ideally mineral-based-UVA/UVB -SPF 30 or higher) use encouraged, sun protection/sun protective clothing, self exams reviewed Followup as noted RTC ---routine checkup 6 mos -one year or p.r.n.    Encounter Times   Including precharting, reviewing chart, prior notes obtaining history: 10 minutes, medical exam :10 minutes, notes on body map, plan, counseling 10minutes My total time spent caring for the patient on the day of the encounter: 30 minutes     The patient indicates understanding of these issues and agrees to the plan.  The patient is asked to return as noted in follow-up/ above.    This note was generated using Dragon voice recognition software.  Please contact me regarding any confusion resulting from errors in recognition..  Note to patient and family: The 21st Century  Cures Act makes medical notes like these available to patients. However, be advised this is a medical document. It is intended as [id number] communication and monitoring of a patient's care needs. It is written in medical language and may contain abbreviations or verbiage that are unfamiliar. It may appear blunt or direct. Medical documents are intended to carry relevant information, facts as evident and the clinical opinion of the practitioner.       [1]   Allergies  Allergen Reactions    Mildew ITCHING    Mold ITCHING    Pollen ITCHING

## 2025-02-03 ENCOUNTER — LAB ENCOUNTER (OUTPATIENT)
Dept: LAB | Age: 50
End: 2025-02-03
Attending: INTERNAL MEDICINE
Payer: COMMERCIAL

## 2025-02-03 DIAGNOSIS — R73.01 IFG (IMPAIRED FASTING GLUCOSE): ICD-10-CM

## 2025-02-03 DIAGNOSIS — E78.00 HYPERCHOLESTEROLEMIA: ICD-10-CM

## 2025-02-03 LAB
CHOLEST SERPL-MCNC: 209 MG/DL (ref ?–200)
EST. AVERAGE GLUCOSE BLD GHB EST-MCNC: 108 MG/DL (ref 68–126)
FASTING PATIENT LIPID ANSWER: YES
HBA1C MFR BLD: 5.4 % (ref ?–5.7)
HDLC SERPL-MCNC: 42 MG/DL (ref 40–59)
LDLC SERPL CALC-MCNC: 146 MG/DL (ref ?–100)
NONHDLC SERPL-MCNC: 167 MG/DL (ref ?–130)
TRIGL SERPL-MCNC: 115 MG/DL (ref 30–149)
VLDLC SERPL CALC-MCNC: 22 MG/DL (ref 0–30)

## 2025-02-03 PROCEDURE — 80061 LIPID PANEL: CPT

## 2025-02-03 PROCEDURE — 36415 COLL VENOUS BLD VENIPUNCTURE: CPT

## 2025-02-03 PROCEDURE — 83036 HEMOGLOBIN GLYCOSYLATED A1C: CPT

## 2025-02-10 ENCOUNTER — ORDER TRANSCRIPTION (OUTPATIENT)
Dept: ADMINISTRATIVE | Facility: HOSPITAL | Age: 50
End: 2025-02-10

## 2025-02-10 DIAGNOSIS — Z13.9 SCREENING FOR CONDITION: Primary | ICD-10-CM

## 2025-02-15 ENCOUNTER — NURSE TRIAGE (OUTPATIENT)
Dept: INTERNAL MEDICINE CLINIC | Facility: CLINIC | Age: 50
End: 2025-02-15

## 2025-02-15 NOTE — TELEPHONE ENCOUNTER
Action Requested: Summary for Provider     []  Critical Lab, Recommendations Needed  [] Need Additional Advice  []   FYI    []   Need Orders  [] Need Medications Sent to Pharmacy  []  Other     SUMMARY: Recommended visit today, no available appointments, patient agreeable to being seen in immediate care     Reason for call: Shortness Of Breath  Onset: 5 days     Called and spoke with patient, he states he is having heavy breathing/shortness of breath, symptoms started Monday, stable since then but symptoms intermittent.     Reason for Disposition   MILD difficulty breathing (e.g., minimal/no SOB at rest, SOB with walking, pulse < 100) of new-onset or worse than normal    Protocols used: Breathing Difficulty-A-OH

## 2025-02-15 NOTE — TELEPHONE ENCOUNTER
Patient called and is requesting to speak to a dr or nurse said that he is breathing heavy for a couple of days.

## 2025-02-16 ENCOUNTER — APPOINTMENT (OUTPATIENT)
Dept: GENERAL RADIOLOGY | Age: 50
End: 2025-02-16
Attending: NURSE PRACTITIONER
Payer: COMMERCIAL

## 2025-02-16 ENCOUNTER — HOSPITAL ENCOUNTER (OUTPATIENT)
Age: 50
Discharge: HOME | End: 2025-02-16
Payer: COMMERCIAL

## 2025-02-16 VITALS
DIASTOLIC BLOOD PRESSURE: 86 MMHG | RESPIRATION RATE: 18 BRPM | HEART RATE: 78 BPM | TEMPERATURE: 98 F | OXYGEN SATURATION: 97 % | SYSTOLIC BLOOD PRESSURE: 123 MMHG

## 2025-02-16 DIAGNOSIS — R06.02 SHORTNESS OF BREATH: Primary | ICD-10-CM

## 2025-02-16 DIAGNOSIS — R91.1 NODULE OF LEFT LUNG: ICD-10-CM

## 2025-02-16 LAB
#MXD IC: 0.5 X10ˆ3/UL (ref 0.1–1)
ATRIAL RATE: 75 BPM
BUN BLD-MCNC: 14 MG/DL (ref 7–18)
CHLORIDE BLD-SCNC: 104 MMOL/L (ref 98–112)
CO2 BLD-SCNC: 26 MMOL/L (ref 21–32)
CREAT BLD-MCNC: 1 MG/DL
DDIMER WHOLE BLOOD: <200 NG/ML DDU (ref ?–400)
EGFRCR SERPLBLD CKD-EPI 2021: 91 ML/MIN/1.73M2 (ref 60–?)
GLUCOSE BLD-MCNC: 101 MG/DL (ref 70–99)
HCT VFR BLD AUTO: 45.4 %
HCT VFR BLD CALC: 49 %
HGB BLD-MCNC: 15.3 G/DL
ISTAT IONIZED CALCIUM FOR CHEM 8: 1.19 MMOL/L (ref 1.12–1.32)
LYMPHOCYTES # BLD AUTO: 2.1 X10ˆ3/UL (ref 1–4)
LYMPHOCYTES NFR BLD AUTO: 32.7 %
MCH RBC QN AUTO: 27.5 PG (ref 26–34)
MCHC RBC AUTO-ENTMCNC: 33.7 G/DL (ref 31–37)
MCV RBC AUTO: 81.7 FL (ref 80–100)
MIXED CELL %: 7.9 %
NEUTROPHILS # BLD AUTO: 3.9 X10ˆ3/UL (ref 1.5–7.7)
NEUTROPHILS NFR BLD AUTO: 59.4 %
P AXIS: 40 DEGREES
P-R INTERVAL: 156 MS
PLATELET # BLD AUTO: 267 X10ˆ3/UL (ref 150–450)
POTASSIUM BLD-SCNC: 4 MMOL/L (ref 3.6–5.1)
Q-T INTERVAL: 360 MS
QRS DURATION: 88 MS
QTC CALCULATION (BEZET): 402 MS
R AXIS: 23 DEGREES
RBC # BLD AUTO: 5.56 X10ˆ6/UL
SODIUM BLD-SCNC: 140 MMOL/L (ref 136–145)
T AXIS: 8 DEGREES
TROPONIN I BLD-MCNC: <0.02 NG/ML
VENTRICULAR RATE: 75 BPM
WBC # BLD AUTO: 6.5 X10ˆ3/UL (ref 4–11)

## 2025-02-16 PROCEDURE — 71046 X-RAY EXAM CHEST 2 VIEWS: CPT | Performed by: NURSE PRACTITIONER

## 2025-02-16 PROCEDURE — 84484 ASSAY OF TROPONIN QUANT: CPT | Performed by: NURSE PRACTITIONER

## 2025-02-16 PROCEDURE — 80047 BASIC METABLC PNL IONIZED CA: CPT | Performed by: NURSE PRACTITIONER

## 2025-02-16 PROCEDURE — 93000 ELECTROCARDIOGRAM COMPLETE: CPT | Performed by: NURSE PRACTITIONER

## 2025-02-16 PROCEDURE — 85025 COMPLETE CBC W/AUTO DIFF WBC: CPT | Performed by: NURSE PRACTITIONER

## 2025-02-16 PROCEDURE — 99204 OFFICE O/P NEW MOD 45 MIN: CPT | Performed by: NURSE PRACTITIONER

## 2025-02-16 NOTE — ED INITIAL ASSESSMENT (HPI)
SOB x 6 days with activity and talking.  Denies nasal congestion, cough, temp, and pain.  Traveled Tuesday - Friday to [place] - went for meeting not skiing.

## 2025-02-16 NOTE — DISCHARGE INSTRUCTIONS
Your chest x-ray, labs and EKG are unremarkable today.  I do not have a good answer of why you are having the shortness of breath you are experiencing.  Your heart rhythm, electrolytes, blood levels and lung spaces all look normal today.    There is an incidental finding of a left lung nodule which was present on previous imaging of your chest last on file in 2020.    Please call your primary care provider on Monday for reevaluation and appointment this week.  Further testing may be needed that we do not have in the immediate care setting.    Make sure you are drinking plenty of fluids including water, good diet and rest.  Avoid any increased strenuous workouts heavy running or lifting until seen by your primary again.    Go to ER for any worsening chest pain, palpitations, dizziness, vision changes or worsening shortness of breath

## 2025-02-16 NOTE — ED PROVIDER NOTES
Patient Seen in: [facility]      History     Chief Complaint   Patient presents with    Difficulty Breathing     Stated Complaint: SOB    Subjective:   HPI    49 yr old male on omeprazole for GERD, here for evaluation of shortness of breath, that has been intermittent, for about 6 days now. He reports it occurs at random times, when at rest, when moving or walking, in the shower, etc. Denies having cough, runny nose, congestion or any viral symptoms prior to or currently. He denies any recent infections in the last month. He did travel this week for work but was having this prior to travel. He smokes cigars a few times a year, denies recent surgery, immobilization, hormone therapy. HX cardiomyopathy years ago post viral illness, which has been cleared since then.       Objective:     Past Medical History:    Acute pharyngitis    Allergic rhinitis    Calculus of kidney    Cardiomyopathy (HCC)    Chronic sinusitis    Hx of motion sickness    Lipid screening    Seasonal allergies    Visual impairment              Past Surgical History:   Procedure Laterality Date    Colonoscopy screening - referral N/A 08/24/2022    Procedure: COLONOSCOPY-SCREENING;  Surgeon: Gail Clemente MD;  Location: [facility] ENDOSCOPY    Other surgical history  2004    Chronic sinusitis, sinus surgery     Tonsillectomy  01/01/1985    Likely ‘84-‘85    Vasectomy  2011                Social History     Socioeconomic History    Marital status:    Tobacco Use    Smoking status: Light Smoker     Types: Cigars    Smokeless tobacco: Never    Tobacco comments:     I smoke 6-8 cigars per summer when i golf   Vaping Use    Vaping status: Never Used   Substance and Sexual Activity    Alcohol use: Yes     Alcohol/week: 5.0 standard drinks of alcohol     Types: 1 Glasses of wine, 2 Cans of beer, 2 Standard drinks or equivalent per week     Comment: weekly    Drug use: Never   Other Topics Concern    Caffeine Concern Yes     Comment: Weekly; a  couple, soda     Pt has a pacemaker No    Pt has a defibrillator No    Reaction to local anesthetic No     Social Drivers of Health     Food Insecurity: No Food Insecurity (12/4/2024)    NCSS - Food Insecurity     Worried About Running Out of Food in the Last Year: No     Ran Out of Food in the Last Year: No   Transportation Needs: No Transportation Needs (12/4/2024)    NCSS - Transportation     Lack of Transportation: No   Housing Stability: Not At Risk (12/4/2024)    NCSS - Housing/Utilities     Has Housing: Yes     Worried About Losing Housing: No     Unable to Get Utilities: No              Review of Systems    Positive for stated complaint: SOB  Other systems are as noted in HPI.  Constitutional and vital signs reviewed.      All other systems reviewed and negative except as noted above.    Physical Exam     ED Triage Vitals [02/16/25 0931]   BP (!) 129/92   Pulse 81   Resp 18   Temp 97.8 °F (36.6 °C)   Temp src Oral   SpO2 99 %   O2 Device None (Room air)       Current Vitals:   Vital Signs  BP: 123/86  Pulse: 78  Resp: 18  Temp: 97.8 °F (36.6 °C)  Temp src: Oral    Oxygen Therapy  SpO2: 97 %  O2 Device: None (Room air)        Physical Exam  Vitals and nursing note reviewed.   Constitutional:       General: He is not in acute distress.     Appearance: He is well-developed. He is not ill-appearing, toxic-appearing or diaphoretic.      Interventions: He is not intubated.  HENT:      Head: Normocephalic and atraumatic.      Mouth/Throat:      Mouth: Mucous membranes are moist.      Pharynx: Oropharynx is clear. No pharyngeal swelling or oropharyngeal exudate.   Eyes:      General: Lids are normal. Vision grossly intact.      Pupils: Pupils are equal, round, and reactive to light.   Neck:      Vascular: No JVD.   Cardiovascular:      Rate and Rhythm: Normal rate and regular rhythm.      Heart sounds: Normal heart sounds.   Pulmonary:      Effort: Pulmonary effort is normal. No tachypnea, bradypnea, accessory  muscle usage or respiratory distress. He is not intubated.      Breath sounds: No stridor. Examination of the right-upper field reveals rhonchi. Rhonchi present. No decreased breath sounds, wheezing or rales.   Chest:      Chest wall: No tenderness or crepitus.   Abdominal:      Palpations: Abdomen is soft.   Musculoskeletal:         General: Normal range of motion.      Cervical back: Full passive range of motion without pain, normal range of motion and neck supple.      Right lower leg: No edema.      Left lower leg: No edema.   Skin:     General: Skin is warm and dry.      Capillary Refill: Capillary refill takes less than 2 seconds.      Coloration: Skin is not cyanotic or pale.      Findings: No ecchymosis or rash.   Neurological:      Mental Status: He is alert.   Psychiatric:         Mood and Affect: Mood normal.         Behavior: Behavior normal. Behavior is cooperative.           ED Course     Labs Reviewed   POCT ISTAT CHEM8 CARTRIDGE - Abnormal; Notable for the following components:       Result Value    ISTAT Glucose 101 (*)     All other components within normal limits   D-DIMER (POC) - Normal   ISTAT TROPONIN - Normal   POCT CBC     EKG    Rate, intervals and axes as noted on EKG Report.  Rate: 75  Rhythm: Sinus Rhythm  Reading: NSR, no STEMI           ED Course as of 02/16/25 1140  ------------------------------------------------------------  Time: 02/16 0953  Value: EKG 12 Lead  Comment: Normal sinus rhythm  Normal ECG  When compared with ECG of 18-JAN-2024 10:34,  T wave inversion now evident in Inferior leads  T wave inversion no longer evident in Anterior leads      ------------------------------------------------------------  Time: 02/16 0953  Value: POCT CBC  Comment: (Reviewed)  ------------------------------------------------------------  Time: 02/16 1007  Value: SPENCER ISTAT chem8 cartridge(!)  Comment: (Reviewed)  ------------------------------------------------------------  Time: 02/16  1011  Value: ISTAT Troponin  Comment: (Reviewed)  ------------------------------------------------------------  Time: 02/16 1037  Value: D-Dimer,Triage  Comment: (Reviewed)  ------------------------------------------------------------  Time: 02/16 1040  Value: XR CHEST PA + LAT CHEST ([facility]=[id number])  Comment:    Impression  CONCLUSION:     Negative for radiographically evident acute intrathoracic process              MDM     49 yr old male here for evaluation of shortness of breath x 6 days. Denies cough or cold symptoms recently. Denies chest pain, palpitations.    ON exam, pt well appearing lungs noted with right upper rhonchi that clears. Remaining LS normal. Airway patent. Skin warm and dry with no rash. Talking in full sentences in no resp distress at rest at this time. No tachypnea, hypoxia.    Differential diagnoses reflecting the complexity of care include but are not limited to ACS, Arrhythmia, PE, Pneumonia, pleural effusion, anemia, electrolyte imbalance.    Comorbidities that add complexity to management include: GERD  History obtained by an independent source was from: patient  My independent interpretations of studies include: EKG NSR< no stemi  CBC unremarkable  Chem unremarkable  Trop NEG  DIMER    Shared decision making was done by: steven, myself  Discussions of management was done with: Dr Gomez attending at [facility]    Patient is well appearing, non-toxic and in no acute distress.  Vital signs are stable.   Dimer is neg, will add CXR.  CXR= negative, incidental left lung nodule.  Per chart review unchanged from 2020    Discussed results and plan.  Advised to follow-up with primary care provider Monday morning for an appointment for continued evaluation of his symptoms.  There is nothing abnormal or urgent that is found today's workup.  Discussed p.o. fluids, rest, good hydration and food intake and avoiding strenuous activities or increased lifting workouts until seen by primary.    All questions  answered. Return and ER precautions given.    Counseled: Patient (and guardian if applicable), regarding diagnosis, regarding treatment plan, regarding diagnostic results, regarding prescription, I have discussed with the patient the results of tests, differential diagnosis, and warning signs and symptoms that should prompt immediate return or ER visit. The patient understands these instructions and agrees to the follow-up plan provided. There is no barriers to learning. Appropriate f/u given. Patient agrees to seek medical attention for any concerns/problems/complications.            Medical Decision Making      Disposition and Plan     Clinical Impression:  1. Shortness of breath    2. Nodule of left lung         Disposition:  Discharge  2/16/2025 10:45 am    Follow-up:  Lana Mcadams MD  [address] [place] [id number]  [phone]    Call in 1 day            Medications Prescribed:  Discharge Medication List as of 2/16/2025 10:55 AM              Supplementary Documentation:

## 2025-02-17 ENCOUNTER — OFFICE VISIT (OUTPATIENT)
Dept: INTERNAL MEDICINE CLINIC | Facility: CLINIC | Age: 50
End: 2025-02-17

## 2025-02-17 ENCOUNTER — TELEPHONE (OUTPATIENT)
Dept: INTERNAL MEDICINE CLINIC | Facility: CLINIC | Age: 50
End: 2025-02-17

## 2025-02-17 VITALS
BODY MASS INDEX: 26.96 KG/M2 | DIASTOLIC BLOOD PRESSURE: 86 MMHG | HEIGHT: 69 IN | HEART RATE: 88 BPM | SYSTOLIC BLOOD PRESSURE: 128 MMHG | WEIGHT: 182 LBS | OXYGEN SATURATION: 98 %

## 2025-02-17 DIAGNOSIS — R06.02 SOB (SHORTNESS OF BREATH) ON EXERTION: Primary | ICD-10-CM

## 2025-02-17 PROCEDURE — 99214 OFFICE O/P EST MOD 30 MIN: CPT | Performed by: INTERNAL MEDICINE

## 2025-02-17 PROCEDURE — 3008F BODY MASS INDEX DOCD: CPT | Performed by: INTERNAL MEDICINE

## 2025-02-17 PROCEDURE — 3074F SYST BP LT 130 MM HG: CPT | Performed by: INTERNAL MEDICINE

## 2025-02-17 PROCEDURE — 3079F DIAST BP 80-89 MM HG: CPT | Performed by: INTERNAL MEDICINE

## 2025-02-17 RX ORDER — ALBUTEROL SULFATE 90 UG/1
1 INHALANT RESPIRATORY (INHALATION) EVERY 6 HOURS PRN
Qty: 1 EACH | Refills: 0 | Status: SHIPPED | OUTPATIENT
Start: 2025-02-17

## 2025-02-17 NOTE — TELEPHONE ENCOUNTER
Patient calling,verified name and date of birth.  Patient is requesting  an appointment with Dr Mcadams for urgent care follow up:  Future Appointments   Date Time Provider Department Center   2/17/2025  4:15 PM Lana Mcadams MD [id number]  [place] [facility]   3/31/2025 11:30 AM [facility] RM1 [facility] CT SCAN EM [facility]

## 2025-02-17 NOTE — PROGRESS NOTES
Subjective:     Patient ID: Romeo Pendleton is a 49 year old male.    Shortness Of Breath        History/Other:     He came in today for follow-up from urgent care he went to urgent care because of shortness of breath.  He states that this is ongoing for almost 1 week he denies any cough no fever or chills, he denies any sinus pressure congestion.  He states that he feels like he can take a deep breath ,he feels short of breath.  At urgent care they did a D-dimer was negative chest x-ray was negative troponin was negative patient was discharged home. He denies any chest pain, no leg swelling   He does not have history of asthma.  He has history of allergies but during the wintertime he does not take any allergy medication  Review of Systems   Constitutional: Negative.    HENT: Negative.     Respiratory:  Positive for shortness of breath.    Cardiovascular: Negative.    Gastrointestinal: Negative.    Genitourinary: Negative.    Musculoskeletal: Negative.    Skin: Negative.    Neurological: Negative.    Hematological: Negative.    Psychiatric/Behavioral: Negative.       Current Outpatient Medications   Medication Sig Dispense Refill    Omeprazole 40 MG Oral Capsule Delayed Release TAKE 1 CAPSULE BY MOUTH EVERY DAY BEFORE A MEAL (Patient not taking: Reported on 2/17/2025) 90 capsule 3    loratadine-pseudoephedrine ER (LORATADINE-D 24HR)  MG Oral Tablet 24 Hr Take 1 tablet by mouth daily. (Patient not taking: Reported on 2/17/2025) 90 tablet 0     Allergies:Allergies[1]    Past Medical History:    Acute pharyngitis    Allergic rhinitis    Calculus of kidney    Cardiomyopathy (HCC)    Chronic sinusitis    Hx of motion sickness    Lipid screening    Seasonal allergies    Visual impairment      Past Surgical History:   Procedure Laterality Date    Colonoscopy screening - referral N/A 08/24/2022    Procedure: COLONOSCOPY-SCREENING;  Surgeon: Gail Clemente MD;  Location: [facility] ENDOSCOPY    Other surgical history   2004    Chronic sinusitis, sinus surgery     Tonsillectomy  01/01/1985    Likely ‘84-‘85    Vasectomy  2011      Family History   Problem Relation Age of Onset    Diabetes Mother     Cancer Maternal Grandfather     Lung Disorder Paternal Grandfather         Emphysema    Cancer Paternal Grandfather         Heart attack    Cancer Maternal Grandmother         Parkinsons    Cancer Paternal Grandmother         Cancer      Social History:   Social History     Socioeconomic History    Marital status:    Tobacco Use    Smoking status: Light Smoker     Types: Cigars    Smokeless tobacco: Never    Tobacco comments:     I smoke 6-8 cigars per summer when i golf   Vaping Use    Vaping status: Never Used   Substance and Sexual Activity    Alcohol use: Yes     Alcohol/week: 5.0 standard drinks of alcohol     Types: 1 Glasses of wine, 2 Cans of beer, 2 Standard drinks or equivalent per week     Comment: weekly    Drug use: Never   Other Topics Concern    Caffeine Concern Yes     Comment: Weekly; a couple, soda     Pt has a pacemaker No    Pt has a defibrillator No    Reaction to local anesthetic No     Social Drivers of Health     Food Insecurity: No Food Insecurity (12/4/2024)    NCSS - Food Insecurity     Worried About Running Out of Food in the Last Year: No     Ran Out of Food in the Last Year: No   Transportation Needs: No Transportation Needs (12/4/2024)    NCSS - Transportation     Lack of Transportation: No   Housing Stability: Not At Risk (12/4/2024)    NCSS - Housing/Utilities     Has Housing: Yes     Worried About Losing Housing: No     Unable to Get Utilities: No        Objective:   Physical Exam  Vitals and nursing note reviewed.   Constitutional:       Appearance: Normal appearance.   Cardiovascular:      Pulses: Normal pulses.      Heart sounds: Normal heart sounds.   Pulmonary:      Effort: Pulmonary effort is normal.      Breath sounds: Normal breath sounds.   Abdominal:      Palpations: Abdomen is soft.    Musculoskeletal:      Cervical back: Normal range of motion and neck supple.   Neurological:      Mental Status: He is alert. Mental status is at baseline.   Psychiatric:         Mood and Affect: Mood normal.         Assessment & Plan:   No diagnosis found.  Sob -etiology unclear,? Allergies , resume taking allergy medication, will order 2 d echo, if worsening symptoms , go to er   No orders of the defined types were placed in this encounter.      Meds This Visit:  Requested Prescriptions      No prescriptions requested or ordered in this encounter       Imaging & Referrals:  None            [1]   Allergies  Allergen Reactions    Mildew ITCHING    Mold ITCHING    Pollen ITCHING

## 2025-03-10 ENCOUNTER — HOSPITAL ENCOUNTER (OUTPATIENT)
Dept: CV DIAGNOSTICS | Facility: HOSPITAL | Age: 50
Discharge: HOME | End: 2025-03-10
Attending: INTERNAL MEDICINE
Payer: COMMERCIAL

## 2025-03-10 DIAGNOSIS — R06.02 SOB (SHORTNESS OF BREATH) ON EXERTION: ICD-10-CM

## 2025-03-10 PROCEDURE — 93306 TTE W/DOPPLER COMPLETE: CPT | Performed by: INTERNAL MEDICINE

## 2025-03-31 ENCOUNTER — HOSPITAL ENCOUNTER (OUTPATIENT)
Dept: CT IMAGING | Facility: HOSPITAL | Age: 50
Discharge: HOME | End: 2025-03-31
Attending: INTERNAL MEDICINE

## 2025-03-31 DIAGNOSIS — Z13.9 SCREENING FOR CONDITION: ICD-10-CM

## 2025-04-10 ENCOUNTER — PATIENT MESSAGE (OUTPATIENT)
Dept: INTERNAL MEDICINE CLINIC | Facility: CLINIC | Age: 50
End: 2025-04-10

## 2025-04-10 DIAGNOSIS — T78.40XD ALLERGY, SUBSEQUENT ENCOUNTER: ICD-10-CM

## 2025-04-15 RX ORDER — LORATADINE/PSEUDOEPHEDRINE 10MG-240MG
1 TABLET, EXTENDED RELEASE 24 HR ORAL DAILY
Qty: 90 TABLET | Refills: 1 | Status: SHIPPED | OUTPATIENT
Start: 2025-04-15

## 2025-04-15 NOTE — TELEPHONE ENCOUNTER
Please review; protocol failed/No Protocol      Recent Fills: 12/04/2024 #90 for 90 day supply     Last Rx Written: 12/04/2024    Last Office Visit: 02/17/2025

## (undated) DEVICE — TIGERTAIL 5F FLXTIP 70CM

## (undated) DEVICE — GAMMEX® PI HYBRID SIZE 7.5, STERILE POWDER-FREE SURGICAL GLOVE, POLYISOPRENE AND NEOPRENE BLEND: Brand: GAMMEX

## (undated) DEVICE — FORCEPS JUMBO ESCP 240CM 2.8

## (undated) DEVICE — SNAP KOVER: Brand: UNBRANDED

## (undated) DEVICE — MEDI-VAC NON-CONDUCTIVE SUCTION TUBING: Brand: CARDINAL HEALTH

## (undated) DEVICE — PAD,EYE,LARGE,2 1/8"X2 5/8",STERILE,LF: Brand: MEDLINE

## (undated) DEVICE — SLEEVE COMPR M KNEE LEN SGL USE KENDALL SCD

## (undated) DEVICE — SOLUTION IRRIG 1000ML ST H2O AQUALITE PLAS

## (undated) DEVICE — FIBER LSR 365UM 6J 80HZ 120W DL FOR LITHO

## (undated) DEVICE — NITINOL WIRE WITH HYDROPHILIC TIP: Brand: SENSOR

## (undated) DEVICE — KIT CLEAN ENDOKIT 1.1OZ GOWNX2

## (undated) DEVICE — SOLUTION IRRIG 1000ML 0.9% NACL USP BTL

## (undated) DEVICE — KIT ENDO ORCAPOD 160/180/190

## (undated) DEVICE — OPEN-END URETERAL CATHETER SOF-FLEX: Brand: SOF-FLEX

## (undated) DEVICE — GUIDEWIRE ENDOSCP L150CM DIA0.035IN TIP 3CM

## (undated) DEVICE — MEDI-VAC NON-CONDUCTIVE SUCTION TUBING 6MM X 1.8M (6FT.) L: Brand: CARDINAL HEALTH

## (undated) DEVICE — OR TOWEL, 17" X 26" STERILE, BLUE: Brand: PREMIERPRO

## (undated) DEVICE — LINE MNTR ADLT SET O2 INTMD

## (undated) DEVICE — NITINOL STONE RETRIEVAL BASKET: Brand: ZERO TIP

## (undated) DEVICE — SNARE OPTMZ PLPCTM TRP

## (undated) DEVICE — TECH ONLY - ADDITION

## (undated) DEVICE — SOLUTION IRRIG 3000ML 0.9% NACL FLX CONT

## (undated) DEVICE — SNARE ENDOSCOPIC 10MM ROUND

## (undated) DEVICE — 9534HP TRANSPARENT DRSG W/FRAME: Brand: 3M™ TEGADERM™

## (undated) DEVICE — ENDOSCOPIC VALVE WITH ADAPTER.: Brand: SURSEAL® II

## (undated) DEVICE — UROLOGY DRAIN BAG

## (undated) DEVICE — CYSTO PACK: Brand: MEDLINE INDUSTRIES, INC.

## (undated) DEVICE — ISOVUE-300. 61% INFUS BTL

## (undated) DEVICE — 3M™ STERI-STRIP™ COMPOUND BENZOIN TINCTURE 40 BAGS/CARTON 4 CARTONS/CASE C1544: Brand: 3M™ STERI-STRIP™

## (undated) DEVICE — MASK PROC W/VISOR ANTIGLARE

## (undated) DEVICE — SERVICE RENTAL LSR TECH ONLY

## (undated) NOTE — LETTER
[address], [place]  Authorization for Invasive Procedures  1. I hereby authorize Dr. Alvarado Davidson , my physician and whomever may be designated as the doctor's assistant, to perform the following operation and/or procedure:  Colonoscopy on Amanuel Mcmahon at [facility].    2. My physician has explained to me the nature and purpose of the operation or other procedure, possible alternative methods of treatment, the risks involved and the possibility of complications to me. I understand the probable consequences of declining the recommended procedure and the alternative methods of treatment. I acknowledge that no guarantee has been made as to the result that may be obtained. 3. I recognize that during the course of this operation or other procedure, unforeseen conditions may necessitate additional or different procedures than those listed above. I, therefore, further authorize and request that the above-named physician, his/her physician assistants, or designees perform such procedures as are, in his/her professional opinion, necessary and desirable. If I have a Do Not Attempt Resuscitation (DNAR) order in place, that status will be suspended while in the operating room, procedural suite, and during the recovery period unless otherwise explicitly stated by me (or a person authorized to consent on my behalf). The surgeon or my attending physician will determine when the applicable recovery period ends for purposes of reinstating the DNAR order. 4. Should the need arise during my operation or immediate post-operative period; I also consent to the administration of blood and/or blood products.  Further, I understand that despite careful testing and screening of blood and blood products, I may still be subject to ill effects as a result of recieving a blood transfusion an/or blood producst. The following are some, but not all, of the potential risks that can occur: fever and allergic reactions, hemolytic reactions, transmission of disease such as hepatitis, AIDS, cytomegalovirus (CMV), and flluid overload. In the event that I wish to have autologous transfusions of my own blood, or a directed donor transfusion, I will discuss this with my physician. 5. I consent to the photographing of the operations or procedures to be performed for the purposes of advancing medicine, science, and/or education, provided my identity is not revealed. If the procedure has been videotaped, the physician/surgeon will obtain the original videotape. The hospital will not be responsible for storage or maintenance of this tape. 6. I consent to the presence of a  or observer as deemed necessary by my physician or his designee. 7. Any tissues or organs removed in the operation or other procedure may be disposed of by and at the discretion of [facility].    8. I understand that the physician and his/her physician assistants may not be employees or agents of [facility] [place], [facility], [facility], but are independent medical practitioners who have been permitted to use its facilities for the care and treatment of their patients. 9. Patients having a sterilization procedure: I understand that if the procedure is successful the results will be permanent and it will therefore be impossible for me to inseminate, conceive or bear children. I also understand that the procedure is intended to result in sterility, although the result has not been guaranteed. 10. I CERTIFY THAT I HAVE READ AND FULLY UNDERSTAND THE ABOVE CONSENT TO OPERATION and/or OTHER PROCEDURE. 11. I acknowledge that my physician has explained sedation/analgesia administration to me including the risks and benefits. I consent to the administration of sedation/analgesia as may be necessary or desirable in the judgment of my physician.      Signature of Patient:  ________________________________________________ Date: _________Time: _________    Responsible person in case of minor or unconscious: _____________________________Relationship: ____________     Witness Signature: ____________________________________________ Date: __________ Time: ___________    Statement of Physician  My signature below affirms that prior to the time of the procedure, I have explained to the patient and/or his legal representative, the risks and benefits involved in the proposed treatment and any reasonable alternative to the proposed treatment. I have also explained the risks and benefits involved in the refusal of the proposed treatment and have answered the patient's questions. If I have a significant financial interest in this procedure/surgery, I have disclosed this and had a discussion with my patient.     Signature of Physician:   ________________________________________Date: _________Time:_______ Patient Name: Tee Garay  : 1975   Printed: 2022    Medical Record #: [id number]

## (undated) NOTE — ED AVS SNAPSHOT
Paresh Potts   MRN: [id number]    Department:  [facility] Emergency Department   Date of Visit:  5/17/2018           Disclosure     Insurance plans vary and the physician(s) referred by the ER may not be covered by your plan.  Please contact CARE PHYSICIAN AT ONCE OR RETURN IMMEDIATELY TO THE EMERGENCY DEPARTMENT. If you have been prescribed any medication(s), please fill your prescription right away and begin taking the medication(s) as directed.   If you believe that any of the medications

## (undated) NOTE — MR AVS SNAPSHOT
[facility] SPECIALTY [facility] [place] [address] [address] [id number] [phone]               Thank you for choosing us for your health care visit with Cyndi Vazquez MD.  We are glad to serve you and happy to provide you with this summary of Visits < Visit Summaries. MyChart questions? Call [phone] for help. [facility] is NOT to be used for urgent needs. For medical emergencies, dial 911.            Visit [email or web address] online at  [email or web address]